# Patient Record
Sex: MALE | Race: WHITE | NOT HISPANIC OR LATINO | Employment: OTHER | URBAN - METROPOLITAN AREA
[De-identification: names, ages, dates, MRNs, and addresses within clinical notes are randomized per-mention and may not be internally consistent; named-entity substitution may affect disease eponyms.]

---

## 2017-01-18 PROCEDURE — 99285 EMERGENCY DEPT VISIT HI MDM: CPT | Mod: 25

## 2017-01-19 ENCOUNTER — HOSPITAL ENCOUNTER (OUTPATIENT)
Facility: HOSPITAL | Age: 82
Discharge: NURSING, REHAB OR LONG-TERM CARE | End: 2017-01-20
Attending: EMERGENCY MEDICINE | Admitting: INTERNAL MEDICINE
Payer: MEDICARE

## 2017-01-19 DIAGNOSIS — E53.8 B12 DEFICIENCY: ICD-10-CM

## 2017-01-19 DIAGNOSIS — R10.9 ABDOMINAL PAIN: ICD-10-CM

## 2017-01-19 DIAGNOSIS — R53.1 GENERALIZED WEAKNESS: ICD-10-CM

## 2017-01-19 DIAGNOSIS — R52 PAIN: ICD-10-CM

## 2017-01-19 DIAGNOSIS — R53.81 PHYSICAL DECONDITIONING: ICD-10-CM
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
## 2017-01-19 DIAGNOSIS — R29.6 MULTIPLE FALLS: Primary | ICD-10-CM

## 2017-01-19 DIAGNOSIS — R05.9 COUGH: ICD-10-CM

## 2017-01-19 PROBLEM — R79.89 ELEVATED SERUM CREATININE: Status: ACTIVE | Noted: 2017-01-19

## 2017-01-19 LAB
ALBUMIN SERPL BCP-MCNC: 3.3 G/DL
ALP SERPL-CCNC: 101 U/L
ALT SERPL W/O P-5'-P-CCNC: 12 U/L
ANION GAP SERPL CALC-SCNC: 9 MMOL/L
AST SERPL-CCNC: 21 U/L
BACTERIA #/AREA URNS HPF: ABNORMAL /HPF
BASOPHILS # BLD AUTO: 0.03 K/UL
BASOPHILS NFR BLD: 0.3 %
BILIRUB SERPL-MCNC: 2 MG/DL
BILIRUB UR QL STRIP: NEGATIVE
BUN SERPL-MCNC: 26 MG/DL
CALCIUM SERPL-MCNC: 8.9 MG/DL
CHLORIDE SERPL-SCNC: 109 MMOL/L
CLARITY UR: CLEAR
CO2 SERPL-SCNC: 19 MMOL/L
COLOR UR: YELLOW
CREAT SERPL-MCNC: 1.5 MG/DL
DIFFERENTIAL METHOD: ABNORMAL
EOSINOPHIL # BLD AUTO: 0.2 K/UL
EOSINOPHIL NFR BLD: 1.6 %
ERYTHROCYTE [DISTWIDTH] IN BLOOD BY AUTOMATED COUNT: 13.6 %
EST. GFR  (AFRICAN AMERICAN): 48 ML/MIN/1.73 M^2
EST. GFR  (NON AFRICAN AMERICAN): 42 ML/MIN/1.73 M^2
GLUCOSE SERPL-MCNC: 142 MG/DL
GLUCOSE UR QL STRIP: NEGATIVE
HCT VFR BLD AUTO: 35.8 %
HGB BLD-MCNC: 12.2 G/DL
HGB UR QL STRIP: ABNORMAL
HYALINE CASTS #/AREA URNS LPF: 0 /LPF
KETONES UR QL STRIP: NEGATIVE
LEUKOCYTE ESTERASE UR QL STRIP: NEGATIVE
LYMPHOCYTES # BLD AUTO: 1.3 K/UL
LYMPHOCYTES NFR BLD: 12.8 %
MCH RBC QN AUTO: 34 PG
MCHC RBC AUTO-ENTMCNC: 34.1 %
MCV RBC AUTO: 100 FL
MICROSCOPIC COMMENT: ABNORMAL
MONOCYTES # BLD AUTO: 1.1 K/UL
MONOCYTES NFR BLD: 10.5 %
NEUTROPHILS # BLD AUTO: 7.6 K/UL
NEUTROPHILS NFR BLD: 74.6 %
NITRITE UR QL STRIP: NEGATIVE
PH UR STRIP: 6 [PH] (ref 5–8)
PLATELET # BLD AUTO: 152 K/UL
PMV BLD AUTO: 9.4 FL
POTASSIUM SERPL-SCNC: 4.6 MMOL/L
PROT SERPL-MCNC: 6.8 G/DL
PROT UR QL STRIP: ABNORMAL
RBC # BLD AUTO: 3.59 M/UL
RBC #/AREA URNS HPF: 12 /HPF (ref 0–4)
SODIUM SERPL-SCNC: 137 MMOL/L
SP GR UR STRIP: 1.02 (ref 1–1.03)
SQUAMOUS #/AREA URNS HPF: 3 /HPF
URN SPEC COLLECT METH UR: ABNORMAL
UROBILINOGEN UR STRIP-ACNC: 1 EU/DL
WBC # BLD AUTO: 10.15 K/UL
WBC #/AREA URNS HPF: 1 /HPF (ref 0–5)

## 2017-01-19 PROCEDURE — 97165 OT EVAL LOW COMPLEX 30 MIN: CPT

## 2017-01-19 PROCEDURE — 82607 VITAMIN B-12: CPT

## 2017-01-19 PROCEDURE — 25000003 PHARM REV CODE 250: Performed by: INTERNAL MEDICINE

## 2017-01-19 PROCEDURE — 25000003 PHARM REV CODE 250: Performed by: STUDENT IN AN ORGANIZED HEALTH CARE EDUCATION/TRAINING PROGRAM

## 2017-01-19 PROCEDURE — 83540 ASSAY OF IRON: CPT

## 2017-01-19 PROCEDURE — G0378 HOSPITAL OBSERVATION PER HR: HCPCS

## 2017-01-19 PROCEDURE — 85025 COMPLETE CBC W/AUTO DIFF WBC: CPT

## 2017-01-19 PROCEDURE — 81000 URINALYSIS NONAUTO W/SCOPE: CPT

## 2017-01-19 PROCEDURE — 36415 COLL VENOUS BLD VENIPUNCTURE: CPT

## 2017-01-19 PROCEDURE — G8979 MOBILITY GOAL STATUS: HCPCS | Mod: CK

## 2017-01-19 PROCEDURE — G8988 SELF CARE GOAL STATUS: HCPCS | Mod: CK

## 2017-01-19 PROCEDURE — 82728 ASSAY OF FERRITIN: CPT

## 2017-01-19 PROCEDURE — 80053 COMPREHEN METABOLIC PANEL: CPT

## 2017-01-19 PROCEDURE — G8987 SELF CARE CURRENT STATUS: HCPCS | Mod: CL

## 2017-01-19 PROCEDURE — G8978 MOBILITY CURRENT STATUS: HCPCS | Mod: CL

## 2017-01-19 PROCEDURE — 97530 THERAPEUTIC ACTIVITIES: CPT

## 2017-01-19 PROCEDURE — 82746 ASSAY OF FOLIC ACID SERUM: CPT

## 2017-01-19 PROCEDURE — 97161 PT EVAL LOW COMPLEX 20 MIN: CPT

## 2017-01-19 RX ORDER — ENOXAPARIN SODIUM 100 MG/ML
40 INJECTION SUBCUTANEOUS EVERY 24 HOURS
Status: DISCONTINUED | OUTPATIENT
Start: 2017-01-20 | End: 2017-01-19

## 2017-01-19 RX ORDER — METOPROLOL TARTRATE 25 MG/1
25 TABLET, FILM COATED ORAL 2 TIMES DAILY
Status: CANCELLED | OUTPATIENT
Start: 2017-01-19

## 2017-01-19 RX ORDER — AMLODIPINE BESYLATE 5 MG/1
5 TABLET ORAL DAILY
Status: DISCONTINUED | OUTPATIENT
Start: 2017-01-19 | End: 2017-01-20 | Stop reason: HOSPADM

## 2017-01-19 RX ORDER — SODIUM CHLORIDE 9 MG/ML
INJECTION, SOLUTION INTRAVENOUS CONTINUOUS
Status: ACTIVE | OUTPATIENT
Start: 2017-01-19 | End: 2017-01-20

## 2017-01-19 RX ORDER — FERROUS SULFATE 325(65) MG
325 TABLET, DELAYED RELEASE (ENTERIC COATED) ORAL DAILY
Status: DISCONTINUED | OUTPATIENT
Start: 2017-01-20 | End: 2017-01-20 | Stop reason: HOSPADM

## 2017-01-19 RX ORDER — LEVETIRACETAM 500 MG/1
500 TABLET ORAL 2 TIMES DAILY
Status: DISCONTINUED | OUTPATIENT
Start: 2017-01-19 | End: 2017-01-20 | Stop reason: HOSPADM

## 2017-01-19 RX ORDER — ENOXAPARIN SODIUM 100 MG/ML
30 INJECTION SUBCUTANEOUS EVERY 24 HOURS
Status: DISCONTINUED | OUTPATIENT
Start: 2017-01-20 | End: 2017-01-20 | Stop reason: HOSPADM

## 2017-01-19 RX ORDER — ACETAMINOPHEN 325 MG/1
325 TABLET ORAL EVERY 6 HOURS PRN
Status: DISCONTINUED | OUTPATIENT
Start: 2017-01-19 | End: 2017-01-20 | Stop reason: HOSPADM

## 2017-01-19 RX ORDER — TAMSULOSIN HYDROCHLORIDE 0.4 MG/1
0.4 CAPSULE ORAL DAILY
Status: DISCONTINUED | OUTPATIENT
Start: 2017-01-20 | End: 2017-01-20 | Stop reason: HOSPADM

## 2017-01-19 RX ADMIN — SODIUM CHLORIDE: 0.9 INJECTION, SOLUTION INTRAVENOUS at 11:01

## 2017-01-19 RX ADMIN — AMLODIPINE BESYLATE 5 MG: 5 TABLET ORAL at 11:01

## 2017-01-19 RX ADMIN — ACETAMINOPHEN 325 MG: 325 TABLET ORAL at 11:01

## 2017-01-19 RX ADMIN — LEVETIRACETAM 500 MG: 500 TABLET ORAL at 10:01

## 2017-01-19 NOTE — ED TRIAGE NOTES
"Pt presents to ED c/o fall that occurred yesterday at home residence. Unwitnesed. Pt c/o right shoulder pain. States it hurts to move his right arm. Pt lives with "lady friend" who is not here and pt is a poor historian. He has bruises to bilateral arms. He is tender and sore to adbomen, right shoulder hips, and knees. Pt appears weak/frail. Uses walker to ambulate but states he has fallen four times recently. Pt denies chest pain, sob. He is hard of hearing. Mouth is dry. Pt has urine on pants- taken off of him at this time. Shirt appears dirty.   "

## 2017-01-19 NOTE — IP AVS SNAPSHOT
\[facility]\""  [address]  [place] [address]  Phone: [phone]           Patient Discharge Instructions     Our goal is to set you up for success. This packet includes information on your condition, medications, and your home care. It will help you to care for yourself so you don't get sicker and need to go back to the hospital.     Please ask your nurse if you have any questions.        There are many details to remember when preparing to leave the hospital. Here is what you will need to do:    1. Take your medicine. If you are prescribed medications, review your Medication List in the following pages. You may have new medications to  at the pharmacy and others that you'll need to stop taking. Review the instructions for how and when to take your medications. Talk with your doctor or nurses if you are unsure of what to do.     2. Go to your follow-up appointments. Specific follow-up information is listed in the following pages. Your may be contacted by a transition nurse or clinical provider about future appointments. Be sure we have all of the phone numbers to reach you, if needed. Please contact your provider's office if you are unable to make an appointment.     3. Watch for warning signs. Your doctor or nurse will give you detailed warning signs to watch for and when to call for assistance. These instructions may also include educational information about your condition. If you experience any of warning signs to your health, call your doctor.               ** Verify the list of medication(s) below is accurate and up to date. Carry this with you in case of emergency. If your medications have changed, please notify your healthcare provider.             Medication List      START taking these medications        Additional Info                      amlodipine 5 MG tablet   Commonly known as:  NORVASC   Quantity:  90 tablet   Refills:  3   Dose:  5 mg    Last time this was given:  5 mg  on 1/20/2017  9:31 AM   Instructions:  Take 1 tablet (5 mg total) by mouth once daily.     Begin Date    AM    Noon    PM    Bedtime       cyanocobalamin 1000 MCG tablet   Commonly known as:  VITAMIN B-12   Refills:  0   Dose:  1000 mcg    Instructions:  Take 1 tablet (1,000 mcg total) by mouth once daily.     Begin Date    AM    Noon    PM    Bedtime       docusate sodium 100 MG capsule   Commonly known as:  COLACE   Refills:  0   Dose:  100 mg    Last time this was given:  100 mg on 1/20/2017  2:09 PM   Instructions:  Take 1 capsule (100 mg total) by mouth 2 (two) times daily.     Begin Date    AM    Noon    PM    Bedtime         CHANGE how you take these medications        Additional Info                      * aspirin 325 MG tablet   Quantity:  30 tablet   Refills:  11   Dose:  325 mg   What changed:  Another medication with the same name was added. Make sure you understand how and when to take each.    Instructions:  Take 1 tablet (325 mg total) by mouth once daily.     Begin Date    AM    Noon    PM    Bedtime       * aspirin 81 MG EC tablet   Commonly known as:  ECOTRIN   Refills:  0   Dose:  81 mg   What changed:  You were already taking a medication with the same name, and this prescription was added. Make sure you understand how and when to take each.    Instructions:  Take 1 tablet (81 mg total) by mouth once daily.     Begin Date    AM    Noon    PM    Bedtime       levetiracetam 500 MG Tab   Commonly known as:  KEPPRA   Quantity:  60 tablet   Refills:  0   What changed:  Another medication with the same name was removed. Continue taking this medication, and follow the directions you see here.    Last time this was given:  500 mg on 1/20/2017  9:31 AM   Instructions:  TAKE 1 TABLET(500 MG) BY MOUTH TWICE DAILY     Begin Date    AM    Noon    PM    Bedtime       * Notice:  This list has 2 medication(s) that are the same as other medications prescribed for you. Read the directions carefully, and ask your  doctor or other care provider to review them with you.      CONTINUE taking these medications        Additional Info                      ferrous sulfate 324 mg (65 mg iron) Tbec   Refills:  0   Dose:  325 mg    Last time this was given:  325 mg on 1/20/2017  9:31 AM   Instructions:  Take 325 mg by mouth once daily.     Begin Date    AM    Noon    PM    Bedtime       hydrOXYzine HCl 25 MG tablet   Commonly known as:  ATARAX   Refills:  3    Instructions:  3 (three) times daily as needed.     Begin Date    AM    Noon    PM    Bedtime       metoprolol tartrate 25 MG tablet   Commonly known as:  LOPRESSOR   Refills:  0   Dose:  25 mg    Instructions:  Take 25 mg by mouth 2 (two) times daily.     Begin Date    AM    Noon    PM    Bedtime       tamsulosin 0.4 mg Cp24   Commonly known as:  FLOMAX   Refills:  0   Dose:  0.4 mg    Last time this was given:  0.4 mg on 1/20/2017  9:31 AM   Instructions:  Take 0.4 mg by mouth once daily.     Begin Date    AM    Noon    PM    Bedtime            Where to Get Your Medications      You can get these medications from any pharmacy     Bring a paper prescription for each of these medications     amlodipine 5 MG tablet       You don't need a prescription for these medications     aspirin 81 MG EC tablet    cyanocobalamin 1000 MCG tablet    docusate sodium 100 MG capsule                  Please bring to all follow up appointments:    1. A copy of your discharge instructions.  2. All medicines you are currently taking in their original bottles.  3. Identification and insurance card.    Please arrive 15 minutes ahead of scheduled appointment time.    Please call 24 hours in advance if you must reschedule your appointment and/or time.        Follow-up Information     Follow up with [facility].    Specialties:  Nursing Home Agency, SNF Agency    Why:  SNF    Contact information:    [address] [place] [address] [phone]          Discharge References/Attachments      "DOCUSATE SODIUM ORAL CAPSULE (ENGLISH)    AMLODIPINE (ENGLISH)        Primary Diagnosis     Your primary diagnosis was:  Vitamin B12 Deficiency      Admission Information     Date & Time Provider Department CSN    1/19/2017  1:10 AM Thomas Ortega MD [facility] [id number]      Care Providers     Provider Role Specialty Primary office phone    Thomas Ortega MD Attending Provider Internal Medicine [phone]      Your Vitals Were     BP Pulse Temp Resp Height Weight    129/78 (BP Location: Right arm, Patient Position: Lying, BP Method: Automatic) 92 98 °F (36.7 °C) (Oral) 18 5' 5" (1.651 m) 53.5 kg (117 lb 14.4 oz)    SpO2 BMI             97% 19.62 kg/m2         Recent Lab Values        5/25/2011 5/26/2011 5/28/2011 5/29/2011 5/30/2011 6/9/2011 7/8/2011 4/17/2014      1:37 AM  2:36 AM  3:11 AM  3:08 AM  3:25 AM  5:51 AM  4:45 AM 10:32 PM    A1C 5.7 5.6 5.8 5.8 6.0 6.4 (H) 7.1 (H) 6.3 (H)      Pending Labs     Order Current Status    Methylmalonic acid, serum In process      Allergies as of 1/20/2017     No Known Allergies      [facility]     [facility] Nurse Care Line - 24/7 Assistance  Unless otherwise directed by your provider, please contact [facility], our nurse care line that is available for 24/7 assistance.     Registered nurses in the [facility] provide clinical advisement, health education, appointment booking, and other advisory services.  Call for this free service at [phone].        Advance Directives     An advance directive is a document which, in the event you are no longer able to make decisions for yourself, tells your healthcare team what kind of treatment you do or do not want to receive, or who you would like to make those decisions for you.  If you do not currently have an advance directive, [facility] encourages you to create one.  For more information call:  [phone] ([phone]), [phone] ([phone]),  or log on to " [email or web address].        Smoking Cessation     If you would like to quit smoking:   You may be eligible for free services if you are a [place] resident and started smoking cigarettes before September 1, 1988.  Call the Smoking Cessation [employer] (SCT) toll free at (716) 446-9679 or (880) 016-2738.   Call 1-355-QUIT-NOW if you do not meet the above criteria.            Language Assistance Services     ATTENTION: Language assistance services are available, free of charge. Please call [phone].      ATENCIÓN: Si habla español, tiene a pina disposición servicios gratuitos de asistencia lingüística. Llame al [phone].     CHÚ Ý: N?u b?n nói Ti?ng Vi?t, có các d?ch v? h? tr? ngôn ng? mi?n phí dành cho b?n. G?i s? [phone].        Stroke Education              [facility] Sign-Up     Activating your [facility] account is as easy as 1-2-3!     1) Visit [email or web address], select Sign Up Now, enter this activation code and your date of birth, then select Next.  [email or web address]  Expires: 3/5/2017  4:17 PM      2) Create a username and password to use when you visit [facility] in the future and select a security question in case you lose your password and select Next.    3) Enter your e-mail address and click Sign Up!    Additional Information  If you have questions, please e-mail [email or web address] or call [phone] to talk to our [facility] staff. Remember, [facility] is NOT to be used for urgent needs. For medical emergencies, dial 911.          [facility] complies with applicable Federal civil rights laws and does not discriminate on the basis of race, color, national origin, age, disability, or sex.

## 2017-01-19 NOTE — PLAN OF CARE
Problem: Physical Therapy Goal  Goal: Physical Therapy Goal  Goals to be met by: 2017     Patient will increase functional independence with mobility by performin. Supine to sit with Contact Guard Assistance  2. Sit to supine with Moderate Assistance  3. Sit to stand transfer with Moderate Assistance  4. Bed to chair transfer with Moderate Assistance using Rolling Walker or hand held assist  5. Gait x 50 feet with Moderate Assistance using Rolling Walker or hand held assist.   6. Ascend/descend 2 stair with Moderate Assistance   7. Lower extremity functional exercise program x 5 to 10 reps with assistance as needed   Outcome: Ongoing (interventions implemented as appropriate)     Pt lives between 2 homes--1 in [place] and 1 in [facility]. Pt has lady friend that usually lives with him, however she has a FT job in [place] and pt is home alone during the day while in [place]. When pt is in [facility], his children/grandchildren assist him with ADLs/ IADLs. Pt reports he gets up and walks with a walker, at night uses urinal at bedside.      Pt required CGA-min to EOB with increased time; Max A for sit <> stand with RW due to heavy posterior lean; mod-max A to maintain standing while counteracting post lean; total assist of 2 to ambulate 4 steps with R with festinating/shuffling gait; pt has rigidity/tone in extremities - pt has Parkinsonlike physical presentation.      Pt is unsafe to be alone - will require 24 hour care and assistance.      Would recommend SNF, caretaker training, gt/txf belt and wc.

## 2017-01-19 NOTE — PROVIDER PROGRESS NOTES - EMERGENCY DEPT.
Encounter Date: 1/18/2017    ED Physician Progress Notes        Physician Note:   The [facility] "[employer]  has been consulted regarding this patient.  She will f/u with the patient in his home tomorrow.  [facility]  has spoken with the family and offered placement.  The pt and daughter refuse nursing home placement.  The daughter states that she will arrange 24 hour care for the patient at home.

## 2017-01-19 NOTE — PT/OT/SLP EVAL
Physical Therapy  Evaluation/Treatment    Darius Paiz Jr.   MRN: [id number]   Admitting Diagnosis: The primary encounter diagnosis was Multiple falls. Diagnoses of Pain, Fall, Abdominal pain, Cough, Physical deconditioning, and Generalized weakness were also pertinent to this visit.    PT Received On: 01/19/17  PT Start Time: 1150     PT Stop Time: 1210 (also 2443-3092)    PT Total Time (min): 20 min +75 min=95 minutes with OT    Billable Minutes:  Evaluation 20 minutes and Therapeutic Activity 25 minutes    Diagnosis: The primary encounter diagnosis was Multiple falls. Diagnoses of Pain, Fall, Abdominal pain, Cough, Physical deconditioning, and Generalized weakness were also pertinent to this visit.      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     Elevated PSA     History of CVA (cerebrovascular accident)     History of subdural hematoma (post traumatic)     Liver disease     Seizures     Stroke     Urinary tract infection       Past Surgical History   Procedure Laterality Date    [place] hole for subdural hematoma      Gastrostomy tube placement      Cataract extraction           General Precautions: Standard, fall, hearing impaired  Orthopedic Precautions: N/A   Braces: N/A       Do you have any cultural, spiritual, Latter day conflicts, given your current situation?: n/a    Patient History:  Living Environment Comment: Pt lives between 2 homes--1 in [place] and 1 in [facility]. Pt has lady friend that usually lives with him, however she has a FT job in [place] and pt is home alone during the day while in [place]; 2 [place] home in [place], no [facility] condo in [place]. When pt is in [facility], his children/grandchildren assist him with ADLs/ IADLs. Pt reports he gets up and walks with a walker, at night uses urinal at bedside.   Equipment  Currently Used at Home: walker, rolling, bedside commode, bath bench    Previous Level of Function:  Ambulation Skills: needs device  Transfer Skills: needs device  ADL Skills: needs assist    Subjective:  Communicated with nurse prior to session.    Chief Complaint: can't walk; pain   Patient goals: return to PLOF    Pain Rating:  (Patient reports that he has pain in his bones, stomach and back, shoulders  but did not rate but hollered with certain movements; increased pain with movement.)               Pain Rating Post-Intervention:  (decreased pain with rest)    Objective:         Cognitive Exam:  Oriented to: Person, Place, and month,day but not year    Follows Commands/attention: Follows one-step commands  Communication: dysarthria  Safety awareness/insight to disability: impaired    Physical Exam:  Postural examination/scapula alignment: Rounded shoulder, Head forward and Posterior pelvic tilt    Skin integrity: Bruising of extremities  Edema: None noted     Sensation:   NT    UE with decreased ROM and rigidity-see OT notes    Lower Extremity Range of Motion: LE increased tone/rigidity  Right Lower Extremity: WFL except decreaesed end range knee ext, hip fl/abd, df  Left Lower Extremity: WFL except decreaesed end range knee ext, hip fl/abd, df    Lower Extremity Strength:  Right Lower Extremity: WFL  Left Lower Extremity: WFL     Gross motor coordination: impaired 2/2 tone/rigidity-movement control deficits    Functional Mobility:  Bed Mobility:  Scooting/Bridging: Contact Guard Assistance, Minimum Assistance (toward EOB with increased time )  Supine to Sit: Contact Guard Assistance, Minimum Assistance (with increased time)  Sit to Supine: Maximum Assistance (for trunk/LEs; pain with pressure on R side)    Transfers:  Sit <> Stand Assistance: Maximum Assistance (with posterior lean)  Sit <> Stand Assistive Device: Rolling Walker, No Assistive Device (3 trials; VCs/MCs for technique/safety)    Gait:   Gait  Distance: 4 steps with RW with pt having posterior lean requiring total A x2 -pt with festinating/shuffling gait  Assistance 1: Total assistance (assist of 2)  Gait Assistive Device: Rolling walker  Gait Pattern: reciprocal  Gait Deviation(s): decreased step length, increased time in double stance, backward lean, decreased toe-to-floor clearance, decreased weight-shifting ability (shuffling)      Balance:   Static Sit: FAIR-: Maintains without assist but inconsistent   Dynamic Sit: POOR: N/A  Static Stand: POOR: Needs MODERATE assist to maintain  Dynamic stand: 0: N/A    Therapeutic Activities and Exercises:  Bed mob and transfers as described above  Instructed pt in sup <>sit technique as described above  Instructed in sit<>stand technique as described above  Educated family on application and use of gt/txf belt and issued  Educated family on sit<>stand technique and stood for attempt at urination (on second half of split visit) and on sit to supine      AM-PAC 6 CLICK MOBILITY  How much help from another person does this patient currently need?   1 = Unable, Total/Dependent Assistance  2 = A lot, Maximum/Moderate Assistance  3 = A little, Minimum/Contact Guard/Supervision  4 = None, Modified [place]/Independent    Turning over in bed (including adjusting bedclothes, sheets and blankets)?: 2  Sitting down on and standing up from a chair with arms (e.g., wheelchair, bedside commode, etc.): 2  Moving from lying on back to sitting on the side of the bed?: 3  Moving to and from a bed to a chair (including a wheelchair)?: 2  Need to walk in hospital room?: 2  Climbing 3-5 steps with a railing?: 1  Total Score: 12     AM-PAC Raw Score CMS G-Code Modifier Level of Impairment Assistance   6 % Total / Unable   7 - 9 CM 80 - 100% Maximal Assist   10 - 14 CL 60 - 80% Moderate Assist   15 - 19 CK 40 - 60% Moderate Assist   20 - 22 CJ 20 - 40% Minimal Assist   23 CI 1-20% SBA / CGA   24 CH 0% Independent/ Mod I      Patient left supine with nurse notified and family present.    Assessment:   Darius Paiz Jr. is a 86 y.o. male with a medical diagnosis of The primary encounter diagnosis was Multiple falls. Diagnoses of Pain, Fall, Abdominal pain, Cough, Physical deconditioning, and Generalized weakness were also pertinent to this visit.  Pt lives between 2 homes--1 in [place] and 1 in [place] beds at the [facility]. Pt has lady friend that usually lives with him, however she has a FT job in [place] and pt is home alone during the day while in [place]. When pt is in [place] beds at the [facility], his children/grandchildren assist him with ADLs/ IADLs. Pt reports he gets up and walks with a walker, at night uses urinal at bedside.   Pt required CGA-min to EOB with increased time; Max A for sit <> stand with RW due to heavy posterior lean; mod-max A to maintain standing while counteracting post lean; total assist of 2 to ambulate 4 steps with R with festinating/shuffling gait; pt has rigidity/tone in extremities - pt has Parkinsonlike physical presentation.  Pt is unsafe to be alone - will require 24 hour care and assistance.  Would recommend SNF, caretaker training, gt/txf belt and wc.    Rehab identified problem list/impairments: Rehab identified problem list/impairments: gait instability, impaired balance, impaired self care skills, impaired endurance, impaired functional mobilty, decreased safety awareness, pain, decreased upper extremity function, decreased lower extremity function, abnormal tone, decreased ROM, impaired coordination, weakness    Rehab potential is good.    Activity tolerance: Fair    Discharge recommendations: Discharge Facility/Level Of Care Needs: nursing facility, skilled     Barriers to discharge: Barriers to Discharge: Decreased caregiver support (increased physical burden on caregiver and decreased safety)    Equipment recommendations: Equipment Needed After Discharge: walker, rolling (gt/txf belt)     GOALS:   Physical Therapy  Goals        Problem: Physical Therapy Goal    Goal Priority Disciplines Outcome Goal Variances Interventions   Physical Therapy Goal     PT/OT, PT Ongoing (interventions implemented as appropriate)     Description:  Goals to be met by: 2017     Patient will increase functional independence with mobility by performin. Supine to sit with Contact Guard Assistance  2. Sit to supine with Moderate Assistance  3. Sit to stand transfer with Moderate Assistance  4. Bed to chair transfer with Moderate Assistance using Rolling Walker or hand held assist  5. Gait  x 50 feet with Moderate Assistance using Rolling Walker or hand held assist.   6. Ascend/descend 2 stair with Moderate Assistance   7. Lower extremity functional exercise program x 5 to 10 reps with assistance as needed                 PLAN:    Patient to be seen 6 x/week to address the above listed problems via gait training, therapeutic activities, therapeutic exercises, neuromuscular re-education  Plan of Care expires: 17  Plan of Care reviewed with: patient, daughter, grandchild(zuleima)    Functional Assessment Tool Used: ampac  Score: 12  Functional Limitation: Mobility: Walking and moving around  Mobility: Walking and Moving Around Current Status (): CL  Mobility: Walking and Moving Around Goal Status (): EMERY Plascencia, PT  2017

## 2017-01-19 NOTE — PLAN OF CARE
Problem: Occupational Therapy Goal  Goal: Occupational Therapy Goal  Goals to be met by: 2/19     Patient will increase functional independence with ADLs by performing:    UE Dressing with Contact Guard Assistance.  LE Dressing with Moderate Assistance.  Grooming while standing with Minimal Assistance.  Toileting from toilet with Moderate Assistance for hygiene and clothing management.   Toilet transfer to toilet with Moderate Assistance.  Outcome: Ongoing (interventions implemented as appropriate)  OT roger performed, report to follow     Pt lives between 2 homes--1 in [place] and 1 in [facility].  Pt has lady friend that usually lives with him, however she has a FT job in [place] and pt is home alone during the day while in [place].  When pt is in [facility], his children/grandchildren assist him with ADLs/ IADLs.  Pt reports he gets up and walks with a walker, at night uses urinal at bedside.      He moved to sit EOB CGA-min and increased time. Dep for dressing, urinal use. Max A for sit to stand w/RW, mod-max A to maintain standing.  Total assist of 2 ambulate 4 steps w/RW.  Pt has Parkinsonlike physical presentation.  Pt has decreased AROM BUE, increased tone, shuffling LE pattern in standing while attempting ambulation.     Pt will require 24 hour care and assistance.     Would rec SNF and

## 2017-01-19 NOTE — PT/OT/SLP EVAL
"Occupational Therapy  Evaluation    Darius Paiz Jr.   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     Elevated PSA     History of CVA (cerebrovascular accident)     History of subdural hematoma (post traumatic)     Liver disease     Seizures     Stroke     Urinary tract infection       Past Surgical History   Procedure Laterality Date    [place] hole for subdural hematoma      Gastrostomy tube placement      Cataract extraction           General Precautions: Standard, fall, hearing impaired  Orthopedic Precautions:    Braces:      Do you have any cultural, spiritual, Orthodox conflicts, given your current situation?: no     Patient History:  Living Environment  Lives With: significant other  Living Arrangements: condomini  Living Environment Comment: Pt lives between 2 homes--1 in [place] and 1 in [facility].  Pt has lady friend that usually lives with him, however she has a FT job in [place] and pt is home alone during the day while in [place].  When pt is in [facility], his children/grandchildren assist him with ADLs/ IADLs.  Pt reports he gets up and walks with a walker, at night uses urinal at bedside.   Equipment Currently Used at Home: bedside commode, walker, rolling, bath bench    Prior level of function:            Dominant hand: ambidextrous    Subjective:  Communicated with nurses prior to session.  "I'm thirsty, but I need that powder"  Chief Complaint: generalized pain  Patient/Family stated goals: return home    Pain Rating: other (see " comments) (reports pain in bones, stomach, sores on his back; did not rate)                   Objective:       Cognitive Exam:  Oriented to: Person, Place, Situation and month, day  Follows Commands/attention: Follows one-step commands  Communication: dysarthria  Memory:  Impaired STM  Safety awareness/insight to disability: impaired  Coping skills/emotional control: Appropriate to situation    Visual/perceptual:  NT    Physical Exam:  Postural examination/scapula alignment: Rounded shoulder, Head forward and Posterior pelvic tilt  Skin integrity: Bruising of hands, arms, back  Edema: None noted     Sensation:   Grossly intact    Upper Extremity Range of Motion:  Right Upper Extremity: limited AROM & PROM shoulder, elbow  Left Upper Extremity: limited AROM & PROM shoulder, elbow    Upper Extremity Strength:  Right Upper Extremity: grossly 2+/5  Left Upper Extremity: grossly 2+/5   Strength: decresed    Fine motor coordination:   impaired    Gross motor coordination: impaired    Functional Mobility:  Bed Mobility:  Rolling/Turning Right: Contact guard assistance  Scooting/Bridging: Minimum Assistance, Stand by Assistance  Supine to Sit: Minimum Assistance, Stand by Assistance    Transfers:  Sit <> Stand Assistance: Total Assistance, Maximum Assistance  Sit <> Stand Assistive Device: Rolling Walker    Functional Ambulation: max A of 2 4 stes w/RW, festering gait    Activities of Daily Living:     Feeding adaptive equipment:      UE adaptive equipment:   LE Dressing Level of Assistance: Total assistance  LE adaptive equipment:            Toileting Level of Assistance: Total assistance        Bathing adaptive equipment:     Balance:   Static Sit: FAIR: Maintains without assist, but unable to take any challenges   Dynamic Sit: FAIR: Cannot move trunk without losing balance  Static Stand: POOR: Needs MODERATE assist to maintain  Dynamic stand: 0: N/A    Therapeutic Activities and Exercises:  Eval completed as above.  "Pt/fam educated on post acute services, need for 24 hour assistance, supervision, DME needs for home.  Pt sat EOB, attempted gait, pt unable to safely ambulate.    AM-PAC 6 CLICK ADL  How much help from another person does this patient currently need?  1 = Unable, Total/Dependent Assistance  2 = A lot, Maximum/Moderate Assistance  3 = A little, Minimum/Contact Guard/Supervision  4 = None, Modified [place]/Independent    Putting on and taking off regular lower body clothing? : 1  Bathing (including washing, rinsing, drying)?: 2  Toileting, which includes using toilet, bedpan, or urinal? : 1  Putting on and taking off regular upper body clothing?: 2  Taking care of personal grooming such as brushing teeth?: 3  Eating meals?: 3  Total Score: 12    AM-PAC Raw Score CMS "G-Code Modifier Level of Impairment Assistance   6 % Total / Unable   7 - 9 CM 80 - 100% Maximal Assist   10 - 14 CL 60 - 80% Moderate Assist   15 - 19 CK 40 - 60% Moderate Assist   20 - 22 CJ 20 - 40% Minimal Assist   23 CI 1-20% SBA / CGA   24 CH 0% Independent/ Mod I       Patient left HOB elevated with all lines intact, call button in reach, nurse notified and family present    Assessment:  Darius Paiz Jr. is a 86 y.o. male with a medical diagnosis of <principal problem not specified> and presents with decreased strength, balance, AROM, safety awareness, indep all needs.  Pt will benefit from skilled OT    Rehab identified problem list/impairments: Rehab identified problem list/impairments: weakness, impaired self care skills, impaired endurance, impaired functional mobilty, gait instability, impaired balance, decreased upper extremity function, decreased lower extremity function, pain, decreased safety awareness, decreased coordination, abnormal tone, impaired fine motor, impaired coordination, impaired muscle length, impaired skin, decreased ROM, impaired joint extensibility    Rehab potential is fair.    Activity tolerance: " Fair    Discharge recommendations: Discharge Facility/Level Of Care Needs: nursing facility, skilled     Barriers to discharge: Barriers to Discharge: Decreased caregiver support    Equipment recommendations: wheelchair     GOALS:   Occupational Therapy Goals        Problem: Occupational Therapy Goal    Goal Priority Disciplines Outcome Interventions   Occupational Therapy Goal     OT, PT/OT Ongoing (interventions implemented as appropriate)    Description:  Goals to be met by: 2/19     Patient will increase functional independence with ADLs by performing:    UE Dressing with Contact Guard Assistance.  LE Dressing with Moderate Assistance.  Grooming while standing with Minimal Assistance.  Toileting from toilet with Moderate Assistance for hygiene and clothing management.   Toilet transfer to toilet with Moderate Assistance.                PLAN:  Patient to be seen 5 x/week to address the above listed problems via therapeutic exercises, self-care/home management, therapeutic activities  Plan of Care expires: 02/19/17  Plan of Care reviewed with: patient, family    OT G-codes  Functional Assessment Tool Used: [facility]  Score: 12  Functional Limitation: Self care  Self Care Current Status (): CL  Self Care Goal Status (): EMERY Desouza OT  01/19/2017

## 2017-01-19 NOTE — PROVIDER PROGRESS NOTES - EMERGENCY DEPT.
Encounter Date: 1/18/2017    ED Physician Progress Notes        Physician Note:   PT FAMILY IS UNABLE TO TAKE THE PATIENT HOME AND ADEQUATELY CARE FOR HIM AS HE IS UNABLE TO STAND AND AMBULATE ON HIS OWN.  THE  HAS WORKED ON HAVING THE PATIENT APPROVED FOR PLACEMENT AT [facility] AND THEY WILL LIKELY BE ABLE TO PLACE HIM THERE TOMORROW.  I HAVE SPOKEN TO DR. CLOUD AND HE AGREES TO ADMIT THE PATIENT AS HE CANNOT WALK AND IS NOT A CANDIDATE TO BE D/C HOME.

## 2017-01-19 NOTE — PROVIDER PROGRESS NOTES - EMERGENCY DEPT.
Encounter Date: 1/18/2017    ED Physician Progress Notes        Physician Note:   PT AND OT HAVE EVALUATED THE PATIENT.  THEY RECOMMEND SKILLED CARE PLACEMENT.  FAMILY AND PT AGREE WITH THIS PLAN.  THE PATIENT NEEDS MORE CARE THAN CAN BE GIVEN TO HIM AT HOME.  I FEEL IT IS IN THE BEST INTEREST OF THE PATIENT TO PLACE HIM IN A SKILLED CARE FACILITY SO I WILL SIGN THE APPROPRIATE PAPERWORK TO ALLOW HIM PLACEMENT.

## 2017-01-19 NOTE — ED NOTES
Pt's daughter and grandson are leaving-contact information obtained. Medicines sent with daughter.

## 2017-01-19 NOTE — ED NOTES
Pt's daughter, Annetta, called to check on pt. Pt gives permission to give his daughter information. Annetta states that she does not want pt going to a nursing home, and will try to figure out a plan for someone to care for him at home. Requests to have  call her at [phone].

## 2017-01-19 NOTE — ED NOTES
Spoke with Peyton, , and states will make phone call to daughter to speak about options for patient.

## 2017-01-19 NOTE — PROGRESS NOTES
consulted to arrange nursing home placement.   made phone contact with daughter, Annetta [phone] to inquire regarding nursing home need.  Daughter reported she does not want nursing home placement.  She request home assistance for patients.   inquired if patient has a [facility] care manager and/or .  Daughter reported she does not know.   said she contact [facility] and inquire about  and .       made phone contact with members services at [facility] and inquired who is patient's assigned .  Hope Mitesh [phone] and [phone] is patient's [facility] .   made phone contact with [facility]  and informed her patient was present in the emergency room being discharged home with daughter after she refused nursing home placement.  Daughter is requesting assistance at home with patient.  Hope took patient's information and said she will follow up with daughter tomorrow.    Physical and occupational therapy evaluated patient and recommended skilled level of care.   consulted to arrange skilled nursing facility placement.   met with patient's daughter, Annetta [phone] at bedside regarding skilled level of care.  Daughter was interested in skilled nursing facility placement.  She reported [facility] as her preference.   told her she would send a referral to [facility] but admission will be pending insurance authorization.   also gave daughter patient's [facility]  name and contact.  Daughter informed if patient does not admit to skilled nursing facility,  will follow up with her tomorrow.     faxed referral to [facility] via right.   made phone contact with long term care access services and completed a  level of care eligibility of tool (LOCET) for nursing facility admission.  Then faxed level one pasrr screen and determination form to the Office of Aging and Adult Services for nursing facility admission.   received notice of medical certification (142 form) from the state giving permission for nursing facility admission effective 01/19/17.     received phone call from Rosalva with [facility] stating patient is accepted for admission pending insurance authorization.  She will request an authorization from [facility] and schedule a 2:30pm appointment for daughter to complete admission paper work.       received phone call from Makenna with [facility] stating request for skilled level of care at [facility] has been sent for medical review.  Makenna reported patient can discharge from the emergency room to home.  If authorization is approved patient can admit from home to [facility] for skilled care.      Per Dr. Weir patient will admit to Dr Ortega service and stay overnight.  informed daughter she will follow up with her in the am regarding status of skilled nursing facility admission,

## 2017-01-19 NOTE — ED PROVIDER NOTES
Encounter Date: 1/18/2017       History     Chief Complaint   Patient presents with    Fall     fall earlier today, denies loc. reports difficulty walking and pain to his right shoulder.      Review of patient's allergies indicates:  No Known Allergies  HPI Comments: Patient is an 86 role male with a past medical history of seizures type 2 diabetes prior strokes, dysphagia, anemia who presents to emergency room for evaluation of right shoulder pain and bilateral hip pain after fall that occurred yesterday.  The patient has had diffuse weakness for the past week and is having more difficulty using his walker.  He states he has pain all over.  He also states he's been having intermittent abdominal pain associated with constipation but denies any fevers vomiting diarrhea dysuria or hematuria.  He has no chest pain or shortness of breath at this time.    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     Elevated PSA     History of CVA (cerebrovascular accident)     History of subdural hematoma (post traumatic)     Liver disease     Seizures     Stroke     Urinary tract infection      Past Medical History Pertinent Negatives   Diagnosis Date Noted    Kidney stone 10/30/2014     Past Surgical History   Procedure Laterality Date    [place] hole for subdural hematoma      Gastrostomy tube placement      Cataract extraction       Family History   Problem Relation Age of Onset    Amblyopia Neg Hx     Blindness Neg Hx     Cancer Neg Hx     Cataracts Neg Hx     Diabetes Neg Hx     Glaucoma Neg Hx     Hypertension Neg Hx     Macular degeneration Neg Hx     Retinal detachment Neg Hx     Strabismus Neg Hx     Stroke Neg Hx     Thyroid disease Neg Hx     Prostate cancer Neg Hx     Kidney disease Neg Hx      Social History   Substance Use Topics     Smoking status: Former Smoker     Packs/day: 0.50     Years: 40.00     Types: Cigarettes    Smokeless tobacco: Former User     Quit date: 8/20/1992    Alcohol use No     Review of Systems   Unable to perform ROS: Age   HENT: Negative for sore throat.    Respiratory: Negative for cough and shortness of breath.    Genitourinary: Negative for dysuria and hematuria.   Musculoskeletal: Positive for arthralgias and myalgias.   Skin: Negative for rash and wound.   Neurological: Negative for dizziness, weakness, numbness and headaches.   Psychiatric/Behavioral: Negative for confusion.       Physical Exam   Initial Vitals   BP Pulse Resp Temp SpO2   01/18/17 2338 01/18/17 2338 01/18/17 2338 01/18/17 2338 01/19/17 0125   156/83 92 18 97.2 °F (36.2 °C) 100 %     Physical Exam    Nursing note and vitals reviewed.  Constitutional: He appears well-developed and well-nourished. No distress.   HENT:   Head: Normocephalic and atraumatic.   Right Ear: External ear normal.   Left Ear: External ear normal.   Mouth/Throat: Oropharynx is clear and moist.   Eyes: Conjunctivae and EOM are normal. Pupils are equal, round, and reactive to light.   Neck: Normal range of motion. Neck supple. No JVD present.   Cardiovascular: Normal rate, regular rhythm and normal heart sounds. Exam reveals no gallop and no friction rub.    No murmur heard.  Pulmonary/Chest: Breath sounds normal. He has no wheezes. He has no rhonchi. He has no rales.   Abdominal: Soft. There is no tenderness. There is no rebound and no guarding.   Musculoskeletal: He exhibits edema (1+ edema b/l lower extremitites) and tenderness (ild tenderness over the right and left lateral hips.  Mild tenderness over the right anterior shoulder).   Neurological: He is alert and oriented to person, place, and time. No sensory deficit.   Patient has 5 out of 5 grasp in the bilateral upper extremities and 5 out of 5 dorsiflexion and plantarflexion the bilateral lower extremities   Skin: Skin  is warm.   Old bruises on bilateral forearms         ED Course   Procedures  Labs Reviewed   URINALYSIS - Abnormal; Notable for the following:        Result Value    Protein, UA 3+ (*)     Occult Blood UA 2+ (*)     All other components within normal limits   CBC W/ AUTO DIFFERENTIAL - Abnormal; Notable for the following:     RBC 3.59 (*)     Hemoglobin 12.2 (*)     Hematocrit 35.8 (*)      (*)     MCH 34.0 (*)     Mono # 1.1 (*)     Gran% 74.6 (*)     Lymph% 12.8 (*)     All other components within normal limits   COMPREHENSIVE METABOLIC PANEL - Abnormal; Notable for the following:     CO2 19 (*)     Glucose 142 (*)     BUN, Bld 26 (*)     Creatinine 1.5 (*)     Albumin 3.3 (*)     Total Bilirubin 2.0 (*)     eGFR if  48 (*)     eGFR if non  42 (*)     All other components within normal limits   URINALYSIS MICROSCOPIC - Abnormal; Notable for the following:     RBC, UA 12 (*)     All other components within normal limits             Medical Decision Making:   Patient had a mechanical fall yesterday.  His family states he is feeling diffusely weak.  Vital signs are stable here.  I ordered a right shoulder x-ray which did not show anything acute.  Patient's CBC appears to be stable.  Chemistry shows slight elevation in his creatinine and this could be associated with dehydration.  Chest x-ray shows no acute cardiopulmonary process.  Abdominal x-ray shows no signs of obstruction.  X-rays of bilateral hips show diffuse osteopenia but no signs of acute fracture or malalignment.  Shoulder x-ray shows no acute fracture.    5:51 AM CT head shows nothing acute.  The patient appears to have deconditioning that's rapidly worsened recently to the point where he no longer can use his walker.  This is lead to multiple falls.  His at home with his girlfriend who is not able to care for him.  I do not feel that he is safe for discharge given that he can't even really stand up on his own here  in emergency room.  I don't think he has a hip fracture given that is not complaining of severe pain in his pelvis or hips when he stands.    6:25 AM spoke with the attending, Dr. Osorio and she would like  to evaluate the patient.  I have also ordered a physical therapy consult.  [facility] would like their opinion prior to deciding upon admission.                    ED Course     Clinical Impression:   The primary encounter diagnosis was Multiple falls. Diagnoses of Pain, Fall, Abdominal pain, Cough, Physical deconditioning, and Generalized weakness were also pertinent to this visit.          Isaías Baptiste MD  01/19/17 0552       Isaías Baptiste MD  01/19/17 0626

## 2017-01-19 NOTE — ED NOTES
Dr. Baptiste at bedside for reassessment. Attempted to stand and walk pt. Pt unable to stand without pain. Pt very weak and needed maximum assistance to stand out of the bed. Pt will be admitted due to frequent falls. Pt aware of plan.

## 2017-01-20 VITALS
BODY MASS INDEX: 19.64 KG/M2 | DIASTOLIC BLOOD PRESSURE: 78 MMHG | OXYGEN SATURATION: 97 % | HEIGHT: 65 IN | RESPIRATION RATE: 18 BRPM | TEMPERATURE: 98 F | WEIGHT: 117.88 LBS | HEART RATE: 92 BPM | SYSTOLIC BLOOD PRESSURE: 129 MMHG

## 2017-01-20 PROBLEM — E53.8 B12 DEFICIENCY: Status: ACTIVE | Noted: 2017-01-20

## 2017-01-20 LAB
ANION GAP SERPL CALC-SCNC: 7 MMOL/L
BUN SERPL-MCNC: 23 MG/DL
CALCIUM SERPL-MCNC: 8.5 MG/DL
CHLORIDE SERPL-SCNC: 111 MMOL/L
CO2 SERPL-SCNC: 22 MMOL/L
CREAT SERPL-MCNC: 1.3 MG/DL
EST. GFR  (AFRICAN AMERICAN): 57 ML/MIN/1.73 M^2
EST. GFR  (NON AFRICAN AMERICAN): 49 ML/MIN/1.73 M^2
FERRITIN SERPL-MCNC: 311 NG/ML
FOLATE SERPL-MCNC: 7.7 NG/ML
GLUCOSE SERPL-MCNC: 116 MG/DL
IRON SERPL-MCNC: 36 UG/DL
POTASSIUM SERPL-SCNC: 4 MMOL/L
SATURATED IRON: 18 %
SODIUM SERPL-SCNC: 140 MMOL/L
TOTAL IRON BINDING CAPACITY: 204 UG/DL
TRANSFERRIN SERPL-MCNC: 138 MG/DL
VIT B12 SERPL-MCNC: <146 PG/ML

## 2017-01-20 PROCEDURE — 83921 ORGANIC ACID SINGLE QUANT: CPT

## 2017-01-20 PROCEDURE — 97802 MEDICAL NUTRITION INDIV IN: CPT

## 2017-01-20 PROCEDURE — G0378 HOSPITAL OBSERVATION PER HR: HCPCS

## 2017-01-20 PROCEDURE — 97530 THERAPEUTIC ACTIVITIES: CPT

## 2017-01-20 PROCEDURE — 63600175 PHARM REV CODE 636 W HCPCS: Performed by: INTERNAL MEDICINE

## 2017-01-20 PROCEDURE — 25000003 PHARM REV CODE 250: Performed by: INTERNAL MEDICINE

## 2017-01-20 PROCEDURE — 80048 BASIC METABOLIC PNL TOTAL CA: CPT

## 2017-01-20 PROCEDURE — 25000003 PHARM REV CODE 250: Performed by: STUDENT IN AN ORGANIZED HEALTH CARE EDUCATION/TRAINING PROGRAM

## 2017-01-20 PROCEDURE — 97110 THERAPEUTIC EXERCISES: CPT

## 2017-01-20 PROCEDURE — 36415 COLL VENOUS BLD VENIPUNCTURE: CPT

## 2017-01-20 PROCEDURE — 97116 GAIT TRAINING THERAPY: CPT

## 2017-01-20 PROCEDURE — 94761 N-INVAS EAR/PLS OXIMETRY MLT: CPT

## 2017-01-20 RX ORDER — CYANOCOBALAMIN 1000 UG/ML
1000 INJECTION, SOLUTION INTRAMUSCULAR; SUBCUTANEOUS ONCE
Status: COMPLETED | OUTPATIENT
Start: 2017-01-20 | End: 2017-01-20

## 2017-01-20 RX ORDER — ASPIRIN 81 MG/1
81 TABLET ORAL DAILY
Refills: 0 | Status: ON HOLD | COMMUNITY
Start: 2017-01-20 | End: 2017-03-14

## 2017-01-20 RX ORDER — DOCUSATE SODIUM 100 MG/1
100 CAPSULE, LIQUID FILLED ORAL 2 TIMES DAILY
Refills: 0 | Status: ON HOLD | COMMUNITY
Start: 2017-01-20 | End: 2017-03-14

## 2017-01-20 RX ORDER — PNV NO.95/FERROUS FUM/FOLIC AC 28MG-0.8MG
100 TABLET ORAL DAILY
COMMUNITY
Start: 2017-01-20 | End: 2017-01-20

## 2017-01-20 RX ORDER — DOCUSATE SODIUM 100 MG/1
100 CAPSULE, LIQUID FILLED ORAL DAILY
Status: DISCONTINUED | OUTPATIENT
Start: 2017-01-20 | End: 2017-01-20 | Stop reason: HOSPADM

## 2017-01-20 RX ORDER — LANOLIN ALCOHOL/MO/W.PET/CERES
1000 CREAM (GRAM) TOPICAL DAILY
Status: ON HOLD | COMMUNITY
Start: 2017-01-20 | End: 2017-03-14

## 2017-01-20 RX ORDER — DOCUSATE SODIUM 100 MG/1
100 CAPSULE, LIQUID FILLED ORAL DAILY
Refills: 0 | COMMUNITY
Start: 2017-01-20 | End: 2017-01-20

## 2017-01-20 RX ORDER — AMLODIPINE BESYLATE 5 MG/1
5 TABLET ORAL DAILY
Qty: 90 TABLET | Refills: 3 | Status: ON HOLD | OUTPATIENT
Start: 2017-01-20 | End: 2017-03-14

## 2017-01-20 RX ADMIN — LEVETIRACETAM 500 MG: 500 TABLET ORAL at 09:01

## 2017-01-20 RX ADMIN — ENOXAPARIN SODIUM 30 MG: 100 INJECTION SUBCUTANEOUS at 11:01

## 2017-01-20 RX ADMIN — AMLODIPINE BESYLATE 5 MG: 5 TABLET ORAL at 09:01

## 2017-01-20 RX ADMIN — ACETAMINOPHEN 325 MG: 325 TABLET ORAL at 10:01

## 2017-01-20 RX ADMIN — CYANOCOBALAMIN 1000 MCG: 1000 INJECTION, SOLUTION INTRAMUSCULAR; SUBCUTANEOUS at 09:01

## 2017-01-20 RX ADMIN — FERROUS SULFATE TAB EC 325 MG (65 MG FE EQUIVALENT) 325 MG: 325 (65 FE) TABLET DELAYED RESPONSE at 09:01

## 2017-01-20 RX ADMIN — TAMSULOSIN HYDROCHLORIDE 0.4 MG: 0.4 CAPSULE ORAL at 09:01

## 2017-01-20 RX ADMIN — DOCUSATE SODIUM 100 MG: 100 CAPSULE, LIQUID FILLED ORAL at 02:01

## 2017-01-20 NOTE — PROGRESS NOTES
[facility] Medicine Resident HO-III Progress Note    Subjective:      Darius Paiz Jr. is a 86 y.o. male who is being followed by the U Medicine service at [facility] for frequent falls.     Patient did well overnight, only with minimal complaints of pain. Denies other complaints.     Objective:   Last 24 Hour Vital Signs:  BP  Min: 147/73  Max: 162/78  Temp  Av.3 °F (36.8 °C)  Min: 98.3 °F (36.8 °C)  Max: 98.3 °F (36.8 °C)  Pulse  Av.8  Min: 79  Max: 97  Resp  Av  Min: 16  Max: 16  SpO2  Av.6 %  Min: 95 %  Max: 100 %  I/O last 3 completed shifts:  In: -   Out: 300 [Urine:300]    Physical Examination:  General appearance: alert, appears stated age, cachectic, cooperative and no distress  Head: Normocephalic, without obvious abnormality, atraumatic  Eyes: conjunctivae/corneas clear. PERRL, EOM's intact. Fundi benign.  Neck: supple, symmetrical, trachea midline, thyroid not enlarged, symmetric, no tenderness/mass/nodules and JVP approx 5cm  Lungs: clear to auscultation bilaterally  Heart: regular rate and rhythm, S1, S2 normal, no murmur, click, rub or gallop  Abdomen: soft, non-tender; bowel sounds normal; no masses, no organomegaly  Extremities: extremities normal, atraumatic, no cyanosis or edema  Pulses: 2+ and symmetric  Skin: Skin color, texture, turgor normal. No rashes or lesions  Neurologic: Grossly normal     Laboratory:  Laboratory Data Reviewed: yes  Pertinent Findings:  B12 < 146    Microbiology Data Reviewed: yes  Pertinent Findings:  None    Other Results:  Radiology Data Reviewed: yes  Pertinent Findings:  X-ray hip:   Diffuse osteopenia makes evaluation difficult.    No evidence of fracture or malalignment involving either hip.    Bilateral advanced osteoarthrosis.    KUB: Nonspecific bowel gas pattern without evidence of obstruction.    CXR: No acute process.    Current Medications:     Infusions:   sodium chloride 0.9% 50 mL/hr at 17 6216         Scheduled:   amlodipine  5 mg Oral Daily    enoxaparin  30 mg Subcutaneous Daily    ferrous sulfate  325 mg Oral Daily    levetiracetam  500 mg Oral BID    tamsulosin  0.4 mg Oral Daily        PRN:  acetaminophen, flu vacc [id number] 65yr up(PF), pneumoc 13-brice conj-dip cr(PF)    Antibiotics and Day Number of Therapy:  None    Lines and Day Number of Therapy:  PIV    Assessment:     Darius Paiz Jr. is a 86 y.o.male with  Patient Active Problem List    Diagnosis Date Noted    Fall 01/19/2017    Elevated serum creatinine 01/19/2017    Multiple falls 01/19/2017    CVA (cerebral infarction) 04/17/2014    Gait disorder 10/07/2013    BPH (benign prostatic hyperplasia) 06/17/2013    Seizure 08/20/2012        Plan:     Falls/Deconditioning  - patient with recent increase in falls, family unable to care for at home  - SNF placement being arranged currently, unable to arrange transport and authorization tonight  - will place in observation overnight for patient safety  - PT/OT eval and treat - likely discharge to SNF later today  - B12 level severely low, will replete - may be contributing to falls     History of CVA   - on ASA, statin  - will continue  - no residual deficits     Macrocytic Anemia 2/2 B12 Deficiency  - , H/H very slightly low  - B12 severely depleted, will give IM injection x1, start PO repletion on discharge  - may be significant contributor to falls     Seizure Disorder  - no active seizures  - continue home keppra     BPH  - chronic issue  - continue home flomax     HTN  - on lopressor at home, may not be best choice in patient with falls  - will start norvasc for BP control, slightly elevated in ED     HCM  - needs influenza, pneumovax  - PCP Dr. Duncan     PPx  - lovenox     Dispo  - pending SNF placement    Mookie Whitman  hospitals Internal Medicine HO-III  hospitals Hospital Medicine Service Team B    hospitals Medicine Hospitalist Pager numbers:   hospitals Hospitalist Medicine Team A  (Joey/Jo): [phone]  [facility] Hospitalist Medicine Team B (Serenity/Jordan):  [phone]

## 2017-01-20 NOTE — PROGRESS NOTES
[facility] cannot admit pt today, pt's daughter agreeable to admit to [facility]. Makenna with PHN updated of above a d authorization being built and will be faxed to [facility].  SNF Auth  Per makenna with PHN [id number]. Team updated and facility transfer orders updated of above.

## 2017-01-20 NOTE — PLAN OF CARE
Problem: Physical Therapy Goal  Goal: Physical Therapy Goal  Goals to be met by: 2017     Patient will increase functional independence with mobility by performin. Supine to sit with Contact Guard Assistance  2. Sit to supine with Moderate Assistance  3. Sit to stand transfer with Moderate Assistance  4. Bed to chair transfer with Moderate Assistance using Rolling Walker or hand held assist  5. Gait x 50 feet with Moderate Assistance using Rolling Walker or hand held assist.   6. Ascend/descend 2 stair with Moderate Assistance   7. Lower extremity functional exercise program x 5 to 10 reps with assistance as needed   Outcome: Ongoing (interventions implemented as appropriate)  Patient with complaints of not being able to urinate or defecate; nurse and MD aware; rolling L/R with Onofre, sup to sit with Onofre, maxA sit to supine, sit to stand with maxA and total A x2 to amb 10ft with post lean, shuffling steps; tolerated LE/trunk mobility ex; fearful of falling; will cont with POC.

## 2017-01-20 NOTE — PLAN OF CARE
[facility]    FACILITY TRANSFER ORDERS      Patient Name: Darius Paiz Jr.  YOB: 1930    PCP: Kaleigh Duncan MD   PCP Address: [address] [place] [address] [address] / [place]  PCP Phone Number: [phone]  PCP Fax: [phone]    Encounter Date: 01/20/2017    Admit to: SNF; [facility]    Vital Signs:  Routine    Diagnoses:   Active Hospital Problems    Diagnosis  POA    *B12 deficiency [E53.8]  Yes    Physical deconditioning [R53.81]  Yes    Fall [W19.XXXA]  Yes    Elevated serum creatinine [R79.89]  Yes    Multiple falls [R29.6]  Not Applicable    Gait disorder [R26.9]  Yes    BPH (benign prostatic hyperplasia) [N40.0]  Yes      Resolved Hospital Problems    Diagnosis Date Resolved POA   No resolved problems to display.       Allergies:Review of patient's allergies indicates:  No Known Allergies    Diet: regular diet, Add Boost Glucose 1x/day    Activities: Activity as tolerated    Nursing: Fall Risk    Labs: None Once      CONSULTS:    Physical Therapy to evaluate and treat. , Occupational Therapy to evaluate and treat. and Speech Therapy to evaluate and treat for Swallowing.    MISCELLANEOUS CARE:  None    WOUND CARE ORDERS  None    Medications: Review discharge medications with patient and family and provide education.      Current Discharge Medication List      START taking these medications    Details   amlodipine (NORVASC) 5 MG tablet Take 1 tablet (5 mg total) by mouth once daily.  Qty: 90 tablet, Refills: 3      aspirin (ECOTRIN) 81 MG EC tablet Take 1 tablet (81 mg total) by mouth once daily.  Refills: 0      cyanocobalamin (VITAMIN B-12) 1000 MCG tablet Take 1 tablet (1,000 mcg total) by mouth once daily.      docusate sodium (COLACE) 100 MG capsule Take 1 capsule (100 mg total) by mouth 2 (two) times daily.  Refills: 0         CONTINUE these medications which have NOT CHANGED    Details   aspirin 325 MG tablet Take 1 tablet (325 mg total) by mouth once daily.  Qty:  30 tablet, Refills: 11      ferrous sulfate 324 mg (65 mg iron) TbEC Take 325 mg by mouth once daily.      hydrOXYzine (ATARAX) 25 MG tablet 3 (three) times daily as needed.   Refills: 3      levetiracetam (KEPPRA) 500 MG Tab TAKE 1 TABLET(500 MG) BY MOUTH TWICE DAILY  Qty: 60 tablet, Refills: 0      metoprolol tartrate (LOPRESSOR) 25 MG tablet Take 25 mg by mouth 2 (two) times daily.       tamsulosin (FLOMAX) 0.4 mg Cp24 Take 0.4 mg by mouth once daily.                  _________________________________  Freda Redding MD  01/20/2017

## 2017-01-20 NOTE — PT/OT/SLP PROGRESS
"Physical Therapy  Treatment    Darius Paiz Jr.   MRN: [id number]   Admitting Diagnosis: B12 deficiency    PT Received On: 01/20/17  PT Start Time: 1138     PT Stop Time: 1224    PT Total Time (min): 46 min       Billable Minutes:  Gait [id number] minutes, Therapeutic Activity 15 minutes and Therapeutic Exercise 19 minutes    Treatment Type: Treatment        PTA Visit Number: 0       General Precautions: Standard, fall, hearing impaired  Orthopedic Precautions: N/A   Braces: N/A    Do you have any cultural, spiritual, Mormonism conflicts, given your current situation?: n/a    Subjective:  Communicated with nurse prior to session.  Pt c/o not being able to "pee" or "poo"    Pain Rating:  (reported pain in R shld and R side with pressure, but did not rate; no pain at rest)           Pain Addressed: Reposition, Distraction, Cessation of Activity       Objective:   Patient found with: peripheral IV    Functional Mobility:  Bed Mobility:   Rolling/Turning to Left: Minimum assistance (x4 trials with VCs and increased time)  Rolling/Turning Right: Minimum assistance (x 4 trials wi th VCs and increased time)  Scooting/Bridging: Minimum Assistance (toward EOB; shifting in bed with Onofre and pt bridging)  Supine to Sit: Minimum Assistance (increased time with VCs)  Sit to Supine: Maximum Assistance (trunk and LEs)    Transfers:  Sit <> Stand Assistance: Maximum Assistance (increased time, rocking forward and standing on 3rd count; counteracting of post lean; min/modA to maintain standing)  Sit <> Stand Assistive Device: Rolling Walker    Gait:   Gait Distance: 10ft with RW and total A x2 with posterior lean -festinating/shuffling gait  Assistance 1: Total assistance (x2)  Gait Assistive Device: Rolling walker  Gait Pattern: reciprocal  Gait Deviation(s): increased time in double stance, backward lean, decreased weight-shifting ability, decreased toe-to-floor clearance (shuffling)      Balance:   Static Sit: FAIR-: Maintains " without assist but inconsistent   Dynamic Sit: POOR: N/A  Static Stand: POOR: Needs MODERATE assist to maintain  Dynamic stand: 0: N/A     Therapeutic Activities and Exercises:  Rolling as described above   Sup to sit with pt assisting using UEs, assistance with LEs and trunk  AA ex- trunk forward fl, rotation and laterals x 10 each; LE APs, FAQ, marches, hip abd/add with assist and LE gastroc/ham stretches 3 x 30 sec; standing scapular retraction x 10, hip fl stretch with lumbar ext x 5 reps x 5-10 seconds  Sit to stand with maxA as described above x 3 trials  Standing lateral wt shifts, marches in place  GT as described above    AM-PAC 6 CLICK MOBILITY  How much help from another person does this patient currently need?   1 = Unable, Total/Dependent Assistance  2 = A lot, Maximum/Moderate Assistance  3 = A little, Minimum/Contact Guard/Supervision  4 = None, Modified [place]/Independent    Turning over in bed (including adjusting bedclothes, sheets and blankets)?: 3  Sitting down on and standing up from a chair with arms (e.g., wheelchair, bedside commode, etc.): 2  Moving from lying on back to sitting on the side of the bed?: 3  Moving to and from a bed to a chair (including a wheelchair)?: 2  Need to walk in hospital room?: 2  Climbing 3-5 steps with a railing?: 1  Total Score: 13    AM-PAC Raw Score CMS G-Code Modifier Level of Impairment Assistance   6 % Total / Unable   7 - 9 CM 80 - 100% Maximal Assist   10 - 14 CL 60 - 80% Moderate Assist   15 - 19 CK 40 - 60% Moderate Assist   20 - 22 CJ 20 - 40% Minimal Assist   23 CI 1-20% SBA / CGA   24 CH 0% Independent/ Mod I     Patient left supine with all lines intact, call button in reach, bed alarm on and nurse notified.    Assessment:  Darius Paiz Jr. is a 86 y.o. male with a medical diagnosis of B12 deficiency   Patient with complaints of not being able to urinate or defecate; nurse and MD aware; rolling L/R with Onofre, sup to sit with Onofre,  maxA sit to supine, sit to stand with maxA and total A x2 to amb 10ft with post lean, shuffling steps; tolerated LE/trunk mobility ex; fearful of falling; will cont with POC.  Rehab identified problem list/impairments: Rehab identified problem list/impairments: weakness, impaired self care skills, impaired endurance, impaired balance, gait instability, impaired functional mobilty, abnormal tone, impaired joint extensibility, decreased lower extremity function, decreased upper extremity function, impaired coordination, impaired fine motor, decreased safety awareness, decreased ROM, pain    Rehab potential is good.    Activity tolerance: Fair -fear of falling    Discharge recommendations: Discharge Facility/Level Of Care Needs: nursing facility, skilled     Barriers to discharge: Barriers to Discharge: Decreased caregiver support    Equipment recommendations: Equipment Needed After Discharge: wheelchair     GOALS:   Physical Therapy Goals        Problem: Physical Therapy Goal    Goal Priority Disciplines Outcome Goal Variances Interventions   Physical Therapy Goal     PT/OT, PT Ongoing (interventions implemented as appropriate)     Description:  Goals to be met by: 2017     Patient will increase functional independence with mobility by performin. Supine to sit with Contact Guard Assistance  2. Sit to supine with Moderate Assistance  3. Sit to stand transfer with Moderate Assistance  4. Bed to chair transfer with Moderate Assistance using Rolling Walker or hand held assist  5. Gait  x 50 feet with Moderate Assistance using Rolling Walker or hand held assist.   6. Ascend/descend 2 stair with Moderate Assistance   7. Lower extremity functional exercise program x 5 to 10 reps with assistance as needed                 PLAN:    Patient to be seen 6 x/week  to address the above listed problems via gait training, therapeutic activities, therapeutic exercises  Plan of Care expires: 17  Plan of Care reviewed  with: patient, daughter, grandchild(zuleima)         Ebony Plascencia, PT  01/20/2017

## 2017-01-20 NOTE — PROGRESS NOTES
Patient is complaining that his bladder is full but is not able to urinate.  Dr. Hernandes notified.  Order to straight in and out cath.  Will perform.  Will continue to monitor.

## 2017-01-20 NOTE — H&P
[facility] History and Physical - Resident Note    Admitting Team: [facility] Hospital Medicine Team B  Attending Physician: Dr. Thomas Ortega  Resident: Dr. Mookie Whitman  Interns: Dr. Flavio Rosales    Date of Admit: 1/19/2017    Chief Complaint     Fall (fall earlier today, denies loc. reports difficulty walking and pain to his right shoulder. )   for weeks    Subjective:      History of Present Illness:  Darius Paiz Jr. is a 86 y.o. 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 The patient presented to [facility] on 1/19/2017 with a primary complaint of Fall (fall earlier today, denies loc. reports difficulty walking and pain to his right shoulder. )      The patient was in their usual state of health until the last few weeks when his family noted that he has been becoming weaker, more fatigued, and falling more frequently. States that he has fallen 3-4 times in the past two weeks, most recently yesterday when he could not  his walker anymore and fell to the floor, injuring his shoulder. Patient was brought to the ED and arrangements are being made for skilled nursing facility but patient was unable to return home out of fear for his own safety. Patient denies any fevers, chills, nausea/vomiting.    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 Authorizing Provider   aspirin 325 MG tablet Take 1 tablet (325 mg total) by mouth once daily. 4/21/14 4/21/15  Seun Goins MD   ferrous sulfate 324 mg (65 mg iron) TbEC Take 325 mg by mouth once daily.    Historical Provider, MD   hydrOXYzine (ATARAX) 25 MG tablet 3 (three) times daily as needed.  9/8/14   Historical Provider, MD   levetiracetam (KEPPRA) 500 MG Tab TAKE 1 TABLET(500 MG) BY MOUTH TWICE DAILY 12/2/16   Mookie Hollis MD   metoprolol tartrate (LOPRESSOR) 25 MG tablet Take 25 mg by mouth 2 (two) times daily.  4/17/13   Historical Provider, MD   tamsulosin (FLOMAX) 0.4 mg Cp24 Take 0.4 mg by mouth once daily.    Historical Provider, MD   levetiracetam (KEPPRA) 500 MG Tab TAKE 1 TABLET BY MOUTH TWICE DAILY 10/15/15 1/19/17  Mookie Hollis MD       Family History:  Family History   Problem Relation Age of Onset    Amblyopia Neg Hx     Blindness Neg Hx     Cancer Neg Hx     Cataracts Neg Hx     Diabetes Neg Hx     Glaucoma Neg Hx     Hypertension Neg Hx     Macular degeneration Neg Hx     Retinal detachment Neg Hx     Strabismus Neg Hx     Stroke Neg Hx     Thyroid disease Neg Hx     Prostate cancer Neg Hx     Kidney disease Neg Hx        Social History:  Social History   Substance Use Topics    Smoking status: Former Smoker     Packs/day: 0.50     Years: 40.00     Types: Cigarettes    Smokeless tobacco: Former User     Quit date: 8/20/1992    Alcohol use No       Review of Systems:  Pertinent items are noted in HPI. All other systems are reviewed and are negative.    Health  "Maintaince :   Primary Care Physician: Dr. Duncan  Immunizations:   TDap is up to date.  Influenza is not up to date.  Pneumovax is not up to date.  Cancer Screening:  Colonoscopy: is not up to date.     Objective:   Last 24 Hour Vital Signs:  BP  Min: 151/70  Max: 176/52  Temp  Av.2 °F (36.2 °C)  Min: 97.2 °F (36.2 °C)  Max: 97.2 °F (36.2 °C)  Pulse  Av.8  Min: 79  Max: 97  Resp  Av.7  Min: 16  Max: 18  SpO2  Av.6 %  Min: 95 %  Max: 100 %  Height  Av' 5" (165.1 cm)  Min: 5' 5" (165.1 cm)  Max: 5' 5" (165.1 cm)  Weight  Av.6 kg (127 lb)  Min: 57.6 kg (127 lb)  Max: 57.6 kg (127 lb)  Body mass index is 21.13 kg/(m^2).  I/O last 3 completed shifts:  In: -   Out: 300 [Urine:300]    Physical Examination:  General appearance: alert, appears stated age, cachectic, cooperative and no distress  Head: Normocephalic, without obvious abnormality, atraumatic  Eyes: conjunctivae/corneas clear. PERRL, EOM's intact. Fundi benign.  Neck: supple, symmetrical, trachea midline, thyroid not enlarged, symmetric, no tenderness/mass/nodules and JVP approx 5cm  Lungs: clear to auscultation bilaterally  Heart: regular rate and rhythm, S1, S2 normal, no murmur, click, rub or gallop  Abdomen: soft, non-tender; bowel sounds normal; no masses,  no organomegaly  Extremities: extremities normal, atraumatic, no cyanosis or edema  Pulses: 2+ and symmetric  Skin: Skin color, texture, turgor normal. No rashes or lesions  Neurologic: Grossly normal      Laboratory:  Most Recent Data:  CBC: Lab Results   Component Value Date    WBC 10.15 2017    HGB 12.2 (L) 2017    HCT 35.8 (L) 2017     2017     (H) 2017    RDW 13.6 2017     WBC Differential: 75 % N, 0 % Bands, 13 % L, 11 % M, 2 % Eo, 0 % Baso, 0 additional cells seen  BMP: Lab Results   Component Value Date     2017    K 4.6 2017     2017    CO2 19 (L) 2017    BUN 26 (H) 2017    " CREATININE 1.5 (H) 01/19/2017     (H) 01/19/2017    CALCIUM 8.9 01/19/2017    MG 1.8 04/21/2014    PHOS 2.2 (L) 04/21/2014     LFTs: Lab Results   Component Value Date    PROT 6.8 01/19/2017    ALBUMIN 3.3 (L) 01/19/2017    BILITOT 2.0 (H) 01/19/2017    AST 21 01/19/2017    ALKPHOS 101 01/19/2017    ALT 12 01/19/2017     Coags:   Lab Results   Component Value Date    INR 1.0 04/17/2014     FLP: Lab Results   Component Value Date    CHOL 155 04/17/2014    HDL 51 04/17/2014    LDLCALC 93.2 04/17/2014    TRIG 54 04/17/2014    CHOLHDL 32.9 04/17/2014     DM: Lab Results   Component Value Date    HGBA1C 6.3 (H) 04/17/2014    HGBA1C 7.1 (H) 07/08/2011    HGBA1C 6.4 (H) 06/09/2011    LDLCALC 93.2 04/17/2014    CREATININE 1.5 (H) 01/19/2017     Thyroid: Lab Results   Component Value Date    TSH 2.136 04/17/2014    FREET4 1.06 06/27/2011    P4TTBTI 4.9 06/27/2011     Anemia: Lab Results   Component Value Date    IRON 63 04/17/2014    TIBC 226 (L) 04/17/2014    FERRITIN 171 04/17/2014    PNNUGMTT48 277 04/18/2014    FOLATE 18.2 04/18/2014     Cardiac: Lab Results   Component Value Date    TROPONINI <0.006 04/17/2014    BNP 39 04/17/2014     Urinalysis: Lab Results   Component Value Date    LABURIN  01/20/2014     Multiple organisms isolated. None in predominance.  Repeat if    LABURIN clinically necessary. 01/20/2014    COLORU Yellow 01/19/2017    SPECGRAV 1.025 01/19/2017    NITRITE Negative 01/19/2017    KETONESU Negative 01/19/2017    UROBILINOGEN 1.0 01/19/2017    WBCUA 1 01/19/2017       Trended Lab Data:    Recent Labs  Lab 01/19/17  0209   WBC 10.15   HGB 12.2*   HCT 35.8*      *   RDW 13.6      K 4.6      CO2 19*   BUN 26*   CREATININE 1.5*   *   PROT 6.8   ALBUMIN 3.3*   BILITOT 2.0*   AST 21   ALKPHOS 101   ALT 12       Other Results:  Radiology:  Imaging Results         CT Head Without Contrast (Final result) Result time:  01/19/17 05:31:06    Final result by Diandra Iniguez,  MD (01/19/17 05:31:06)    Impression:      1.  No CT evidence of acute intracranial abnormality.  There is clinical concern for acute ischemia, further evaluation with MRI is recommended if there are no contraindications.    2.  Generalized cerebral volume loss, chronic microvascular ischemic change, and remote left pontine lacunar type infarct.            Electronically signed by: DIEGO MINER  Date:     01/19/17  Time:    05:31     Narrative:    Exam: [id number]  01/19/17  05:05:37 [id number] ([facility]) : CT HEAD WITHOUT CONTRAST    Technique:    Multiple contiguous axial images of the brain were obtained from base to the vertex without the use of intravenous contrast. Sagittal and coronal reconstruction images were formatted in postprocessing.    Comparison:    Head CT and MRI brain for/17/2014.    Findings:      There is diffuse generalized cerebral volume loss with compensatory dilatation of the ventricular system and prominence of cerebral sulci.  There is extensive hypoattenuation within the supratentorial periventricular white matter which is nonspecific likely represent sequela of chronic microvascular ischemic change.  There is a focus of hypoattenuation within the left aspect of the jose, likely coinciding with previously identified lacunar type pontine infarct seen on prior MRI examination.  Gray-white matter differentiation appears otherwise maintained.  There is no evidence of acute intracranial hemorrhage, midline shift or mass effect.  There is patchy paranasal sinus and mastoid air cell ossification.            X-Ray Chest AP Portable (Final result) Result time:  01/19/17 02:54:52    Final result by Rolando Rodarte MD (01/19/17 02:54:52)    Impression:       No acute process.              Electronically signed by: ROLANDO RODARTE MD  Date:     01/19/17  Time:    02:54     Narrative:    Exam: [id number]  01/19/17  02:43:03 [id number] ([facility]) : XR CHEST AP PORTABLE    Technique:    Single frontal chest x-ray    Comparison:     06/26/2011    Findings:      The trachea is unremarkable.  There are calcifications of the aortic knob.  The cardiomediastinal silhouette is within normal limits.  The hemidiaphragms are unremarkable.  There is no evidence of free air beneath the hemidiaphragms.  No pleural effusions are identified.  There is no evidence of a pneumothorax.  No airspace opacities are present.  There are degenerative changes in the osseous structures.            X-Ray Abdomen AP 1 View (Final result) Result time:  01/19/17 02:58:11    Final result by Rolando Rodarte MD (01/19/17 02:58:11)    Impression:       Nonspecific bowel gas pattern without evidence of obstruction.              Electronically signed by: ROLANDO RODARTE MD  Date:     01/19/17  Time:    02:58     Narrative:    Exam: [id number]  01/19/17  02:39:37 [id number] ([facility]) : XR ABDOMEN AP 1 VIEW    Technique:    Single frontal abdomen x-ray.    Comparison:    No direct comparisons are available.    Findings:      Moderate amount of stool is present throughout the colon.  There is no significant distention of the intestinal bowel loops.  There is no evidence of pneumatosis.    There are advanced degenerative changes involving the bilateral hips.  No fractures are identified.  Penile prosthetic device is partially visualized.  There are calcifications in the pelvis.            X-Ray Hips Bilateral 2 View Incl AP Pelvis (Final result) Result time:  01/19/17 02:52:04    Final result by Rolando Rodarte MD (01/19/17 02:52:04)    Impression:       Diffuse osteopenia makes evaluation difficult.    No evidence of fracture or malalignment involving either hip.    Bilateral advanced osteoarthrosis.          Electronically signed by: ROLANDO RODARTE MD  Date:     01/19/17  Time:    02:52     Narrative:    Exam: [id number]  01/19/17  02:38:53 [id number] ([facility]) : XR HIPS BILATERAL 2 VIEW INCL AP PELVIS    Technique:    Frontal and lateral radiographs of the bilateral hips.    Comparison:    12/15/2010    Findings:       Right side: There is diffuse osteopenia.  There is no evidence of a fracture fracture or dislocation involving the right hip.  There is joint space narrowing and osteophytosis involving the right hip.    Left hip: There is diffuse osteopenia making evaluation difficult.  There is no evidence of fracture or dislocation involving the left hip.  Joint space narrowing and osteophytosis are present in the left hip.    Penile prosthesis is present.  There are calcifications in the pelvis.            X-Ray Shoulder Complete 2 View Right (Final result) Result time:  01/19/17 02:07:33    Final result by John Whitman MD (01/19/17 02:07:33)    Impression:        No acute fracture.    Possible calcific tendinitis.      Electronically signed by: JOHN WHITMAN MD  Date:     01/19/17  Time:    02:07     Narrative:    History: .    RIGHT shoulder 3 views:    No fractures or dislocations. Mild degenerative changes. Small soft tissue calcification seen in the region of the biceps tendon.               Assessment:     Darius Paiz Jr. is a 86 y.o. male with:  Patient Active Problem List    Diagnosis Date Noted    Fall 01/19/2017    Elevated serum creatinine 01/19/2017    Multiple falls 01/19/2017    CVA (cerebral infarction) 04/17/2014    Gait disorder 10/07/2013    BPH (benign prostatic hyperplasia) 06/17/2013    Seizure 08/20/2012        Plan:     Falls/Deconditioning  - patient with recent increase in falls, family unable to care for at home  - SNF placement being arranged currently, unable to arrange transport and authorization tonight  - will place in observation overnight for patient safety  - PT/OT eval and treat  - check B12 level to rule out any neurologic cause    History of CVA   - on ASA, statin  - will continue  - no residual deficits    Macrocytic Anemia  - , H/H very slightly low  - will get iron studies, B12, folate    Seizure Disorder  - no active seizures  - continue home keppra    BPH  -  chronic issue  - continue home flomax    HTN  - on lopressor at home, may not be best choice in patient with falls  - will start norvasc for BP control, slightly elevated in ED    HCM  - needs influenza, pneumovax  - PCP Dr. Duncan    PPx  - lovenox    Dispo  - place in observation  - pending SNF placement    Code Status:     Full    Mookie Whitman  [facility] HO-III  [facility] Hospital Medicine Service    [facility] Medicine Hospitalist Pager numbers:   [facility] Hospitalist Medicine Team A (Joey/Jo): [phone]  [facility] Hospitalist Medicine Team B (Serenity/Jordan):  [phone]

## 2017-01-20 NOTE — CONSULTS
[facility]  Adult Nutrition  Consult Note    SUMMARY     Recommendations    Recommendation/Intervention:  1. ST consult to determine safety of po diet.   2. Pt needs ADA restricitons to diet.   3. Add Boost Glucose 1x/day.   4. Encourage good intake at meals.    Goals:   Pt will consume at least 50-75% intake at meals  Nutrition Goal Status: new     Continuum of Care Plan  Referral to Outpatient Services:  (pt to d/c on ADA diet)    Reason for Assessment  Reason for Assessment: nurse/nurse practitioner consult (dysphagia)  Relevent Medical History: 87 yo male admitted with multiple falls. PMH + for seizures, DM, dysphagia, CVA.      General Information Comments: Pt on Regular diet. pt with fair po intake. SPoke with daughter she states he consumes regualr diet at home but does use thickener in his liquids. Pt agrreable to Boost.    Nutrition Prescription Ordered  Current Diet Order: Regular     Nutrition Risk Screen  Nutrition Risk Screen: dysphagia or difficulty swallowing    Nutrition/Diet History  Patient Reported Diet/Restrictions/Preferences: general (thickened liquids)  Food Preferences: no Jainism or cultural food prefs identified  Factors Affecting Nutritional Intake: difficulty/impaired swallowing     Labs/Tests/Procedures/Meds  Pertinent Labs Reviewed: reviewed  Pertinent Labs Comments: Glu 116H, Ca 8.5L  Pertinent Medications Reviewed: reviewed  Pertinent Medications Comments: ferrous sulfate    Physical Findings  Overall Physical Appearance:  (WNL)  Tubes:  (none)  Oral/Mouth Cavity: WDL  Skin:  (Hardeep 17-intact)    Anthropometrics  Height (inches): 65 in  Weight Method: Bed Scale  Weight (kg): 53.5 kg  Ideal Body Weight (IBW), Male: 136 lb  % Ideal Body Weight, Male (lb): 86.73 lb  BMI (kg/m2): 19.63  BMI Grade: 18.5-24.9 - normal     Estimated/Assessed Needs  Weight Used For Calorie Calculations: 61.8 kg (136 lb 3.9 oz) (IBW)   Height (cm): 165.1 cm  Energy Need Method:  (1852 (39  kcal/kg IBW))   RMR (Cook-St. Jeor Equation): 1148.22  Weight Used For Protein Calculations: 61.8 kg (136 lb 3.9 oz) (IBW)  Protein Requirements: 62-74g (1.0-1.2 g/kg)    Malnutrition (Undernutrition) Diagnosis  % Intake of Estimated Energy Needs: 25 - 50%  % Meal Intake: 50%     Nutrition Diagnosis  Nutrition Problem: Inadequate energy intake  Etiology/Related To: dysphagia  Nutrition Diagnosis Signs/Symptoms As Evidenced By: pt with inadeuate po intake  Nutrition Diagnosis Status: New    Monitor and Evaluation  Food and Nutrient Intake: food and beverage intake  Food and Nutrient Adminstration: diet order  Physical Activity and Function: nutrition-related ADLs and IADLs  Anthropometric Measurements: weight  Biochemical Data, Medical Tests and Procedures: electrolyte and renal panel  Nutrition-Focused Physical Findings: overall appearance    Nutrition Risk  Level of Risk: moderate    Nutrition Follow-Up  RD Follow-up?: Yes

## 2017-01-20 NOTE — PT/OT/SLP PROGRESS
"Occupational Therapy  Treatment    Darius Paiz Jr.   MRN: [id number]   Admitting Diagnosis: B12 deficiency    OT Date of Treatment: 01/20/17   OT Start Time: 1502  OT Stop Time: 1535  OT Total Time (min): 33 min    Billable Minutes:  Therapeutic Activity 33    General Precautions: Standard, fall, hearing impaired  Orthopedic Precautions: N/A  Braces: N/A    Do you have any cultural, spiritual, Methodist conflicts, given your current situation?: no    Subjective:  Communicated with nurseLacy prior to session. Patient's daughter and S.O. Present. "I want to sit up"    Pain Rating:  (No pain at rest, but did c/o pain in R shld during WBing - rated as 3/10)           Pain Addressed: Reposition, Distraction, Cessation of Activity       Objective:  Patient found with: peripheral IV     Functional Mobility:  Bed Mobility:  Rolling/Turning Right: Minimum assistance, With side rail  Scooting/Bridging: Minimum Assistance  Supine to Sit: Maximum Assistance  Sit to Supine: Maximum Assistance, With leg lift    Transfers:   Sit <> Stand Assistance: Moderate Assistance  Sit <> Stand Assistive Device: Rolling Walker    Functional Ambulation: N/A    Activities of Daily Living:  Grooming Position: Seated, EOB  Grooming Level of Assistance: Stand by assistance  Toileting Where Assessed: Bed level  Toileting Level of Assistance: Total assistance    Balance:   Static Sit: FAIR: Maintains without assist, but unable to take any challenges   Dynamic Sit: FAIR: Cannot move trunk without losing balance  Static Stand: POOR+: Needs MINIMAL assist to maintain  Dynamic stand: POOR: N/A    Therapeutic Activities and Exercises:  Sup > sit Max (A) this date 2* patient's S.O. Attempting to help and placing patient in awkward position requiring more (A). Sat EOB x 25 mins with SBA/CGA.  Initially, S.O. Began to wash patient's face, but after encouraging pt. To complete himself, he washed face with SBA. Patient continued to  RLE off floor - " with proprioceptive input to top of leg, patient able to maintain foot on the floor. Sit <> stand using RW with mod (A) and VCs for technique. Maintained static standing balance x 2 minutes, on 2nd trial x 3 minutes.  Patient with difficulty following commands to return to supine. Patient with soiled brief - total (A) for diaper change.     AM-PAC 6 CLICK ADL   How much help from another person does this patient currently need?   1 = Unable, Total/Dependent Assistance  2 = A lot, Maximum/Moderate Assistance  3 = A little, Minimum/Contact Guard/Supervision  4 = None, Modified [place]/Independent    Putting on and taking off regular lower body clothing? : 1  Bathing (including washing, rinsing, drying)?: 2  Toileting, which includes using toilet, bedpan, or urinal? : 1  Putting on and taking off regular upper body clothing?: 2  Taking care of personal grooming such as brushing teeth?: 3  Eating meals?: 3  Total Score: 12     AM-PAC Raw Score CMS G-Code Modifier Level of Impairment Assistance   6 % Total / Unable   7 - 9 CM 80 - 100% Maximal Assist   10 - 14 CL 60 - 80% Moderate Assist   15 - 19 CK 40 - 60% Moderate Assist   20 - 22 CJ 20 - 40% Minimal Assist   23 CI 1-20% SBA / CGA   24 CH 0% Independent/ Mod I     Patient left HOB elevated with all lines intact, call button in reach, bed alarm on, nsg notified and PCT and family members present    ASSESSMENT:  Darius Paiz Jr. is a 86 y.o. male with a medical diagnosis of B12 deficiency   Patient requesting to sit up on EOB. While EOB, patient with difficulty maintaining B feet on ground. After standing trials, patient able to keep B feet on ground and with improved sit balance. Patient will benefit from skilled OT to address functional deficits.    Rehab identified problem list/impairments: Rehab identified problem list/impairments: weakness, impaired endurance, impaired self care skills, impaired functional mobilty, gait instability, decreased  safety awareness, impaired balance, pain, edema, impaired cognition, decreased coordination, decreased upper extremity function, decreased lower extremity function    Rehab potential is good.    Activity tolerance: Fair    Discharge recommendations: Discharge Facility/Level Of Care Needs: nursing facility, skilled     Barriers to discharge: Barriers to Discharge: Decreased caregiver support    Equipment recommendations: wheelchair     GOALS:   Occupational Therapy Goals        Problem: Occupational Therapy Goal    Goal Priority Disciplines Outcome Interventions   Occupational Therapy Goal     OT, PT/OT Ongoing (interventions implemented as appropriate)    Description:  Goals to be met by: 2/19     Patient will increase functional independence with ADLs by performing:    UE Dressing with Contact Guard Assistance.  LE Dressing with Moderate Assistance.  Grooming while standing with Minimal Assistance.  Toileting from toilet with Moderate Assistance for hygiene and clothing management.   Toilet transfer to toilet with Moderate Assistance.                Plan:  Patient to be seen 5 x/week to address the above listed problems via self-care/home management, therapeutic activities, therapeutic exercises  Plan of Care expires:    Plan of Care reviewed with: significant other, patient, daughter         YOLIE Taylor  01/20/2017

## 2017-01-20 NOTE — PLAN OF CARE
Problem: Occupational Therapy Goal  Goal: Occupational Therapy Goal  Goals to be met by: 2/19     Patient will increase functional independence with ADLs by performing:    UE Dressing with Contact Guard Assistance.  LE Dressing with Moderate Assistance.  Grooming while standing with Minimal Assistance.  Toileting from toilet with Moderate Assistance for hygiene and clothing management.   Toilet transfer to toilet with Moderate Assistance.   Outcome: Ongoing (interventions implemented as appropriate)  Patient requesting to sit up on EOB. While EOB, patient with difficulty maintaining B feet on ground. After standing trials, patient able to keep B feet on ground and with improved sit balance. Patient will benefit from skilled OT to address functional deficits.

## 2017-01-20 NOTE — PROGRESS NOTES
[facility] gave authorization for patient to admit to [facility] for skilled care.   made phone contact [facility] admit coordinators, Phuong and Rosalva both reported they have left the day.  Admit coordinators reported there is no one that can admit patient to [facility] until Monday, 01/23/2017.     made phone contact with daughter informing her insurance has given authorization for skilled care at [facility] but they can not accept patient until Monday.  Daughter informed authorization can be changed to another facility, [facility] has accepted patient.  Daughter reported patient can admit to [facility].    Transfer facility order faxed to [facility].  Then  received phone call from Kristine with [facility] stating patient can admit to room 229A and [facility] nurse can call report to Ramirez at [phone].     arranged for [facility] wheelchair van to transport within the hour.     made phone contact with daughter and informed her [facility] has accepted patient and transportation has been arranged for within the hour.  Daughter informed regarding observation status and moon letter will be give to patient.     met with patient and girlfriend at bedside informed them patientt can admit to [facility] today.  Patient and girlfriend agreed with admit to [facility].  Girlfriend signed patient choice form, copy given to her and original placed in chart.  Girlfriend given moon letter to give to daughter.    Bedside nurse, Lacy informed to call report to [facility] nurseRamierz at [phone] prior to discharge and [facility] is scheduled to transport within the hour.

## 2017-01-20 NOTE — PLAN OF CARE
"Problem: Fall Risk (Adult)  Goal: Absence of Falls  Patient will demonstrate the desired outcomes by discharge/transition of care.   Outcome: Ongoing (interventions implemented as appropriate)                  Problem: Patient Care Overview  Goal: Plan of Care Review  Outcome: Ongoing (interventions implemented as appropriate)  Pt AAOx4, no family members at bedside. Initial and admit assessment documented per flowsheet. Pt complains of "pain all over" but denies n/v/d and SOB. Pt incontinent of urine, diaper placed. Meds crushed and placed in applesauce and thickening applied to all liquids - pt tolerated well. Safety maintained, bed alarm on zone 2 - will cont to monitor.     Problem: Pressure Ulcer Risk (Hardeep Scale) (Adult,Obstetrics,Pediatric)  Goal: Skin Integrity  Patient will demonstrate the desired outcomes by discharge/transition of care.   Outcome: Ongoing (interventions implemented as appropriate)                    "

## 2017-01-20 NOTE — PLAN OF CARE
Problem: Patient Care Overview  Goal: Plan of Care Review  Outcome: Ongoing (interventions implemented as appropriate)  Pt. On room air in no apparent distress. Will cont. To monitor.

## 2017-01-20 NOTE — PROGRESS NOTES
TN spoke with Makenna with [facility], SNF request has now been sent to External review and answer to arrive shortly.   Makenna will update TN shortly with final decision.

## 2017-01-20 NOTE — PLAN OF CARE
Problem: Dysphagia (Adult)  Goal: Identify Related Risk Factors and Signs and Symptoms  Related risk factors and signs and symptoms are identified upon initiation of Human Response Clinical Practice Guideline (CPG)  Outcome: Ongoing (interventions implemented as appropriate)  Recommendation/Intervention:  1. ST consult to determine safety of po diet.   2. Pt needs ADA restricitons to diet.   3. Add Boost Glucose 1x/day.   4. Encourage good intake at meals.     Goals:   Pt will consume at least 50-75% intake at meals  Nutrition Goal Status: new

## 2017-01-20 NOTE — PROGRESS NOTES
Call received from Makenna with [facility], pt has been approved for Skilled therapy, Makenna will update Phuong @ [facility] admissions.  Facility transfer orders updated and sent via [facility] to [facility], Dr [place] updated of above.

## 2017-01-20 NOTE — PROGRESS NOTES
TN discussed case with Dr. Alarcon.  Pt has been accepted to [facility] pending insurance authorization. SNF request under medical review.  Informed team, pt can discharge from hospital and admit to skilled facility from home.  Dr. Alarcon states pt is not safe to discharge  Home at this time, MD will discuss with team and update TN.

## 2017-01-21 NOTE — PROGRESS NOTES
Patient is being discharged to [facility].  Report called to [facility].  Daughter and significant other at bedside.   Awaiting wheelchair per [facility] to Nursing Home.  Voiced no other concerns.

## 2017-01-23 NOTE — DISCHARGE SUMMARY
[facility] Internal Medicine Discharge Summary    Primary Team: [facility] Internal Medicine Team B  Attending Physician: Dr. Thomas Ortega  Resident: J Carlos  Intern: Manoj    Date of Admit: 1/19/2017  Date of Discharge: 1/23/2017    Discharge to: SNF  Condition: Stable for discharge    Discharge Diagnoses     Patient Active Problem List   Diagnosis    Seizure    BPH (benign prostatic hyperplasia)    Gait disorder    CVA (cerebral infarction)    Fall    Elevated serum creatinine    Multiple falls    B12 deficiency    Physical deconditioning       Consultants and Procedures     Consultants:  Nutrition (CHET Wills)    Procedures:   None    Brief History of Present Illness      Darius Paiz Jr. is a 86 y.o. 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 The patient presented to [facility] on 1/19/2017 with a primary complaint of Fall (fall earlier today, denies loc. reports difficulty walking and pain to his right shoulder. )     The patient was in their usual state of health until the last few weeks when his family noted that he has been becoming weaker, more fatigued, and falling more frequently. States that he has fallen 3-4 times in the past two weeks, most recently yesterday when he could not  his walker anymore and fell to the floor, injuring his shoulder. Patient was brought to the ED and arrangements are being made for skilled nursing facility but patient was unable to return home out of fear for his own safety. Patient denies any fevers, chills, nausea/vomiting.    For the full HPI please refer to the History & Physical from this admission.    Hospital Course By Problem with  Pertinent Findings     Falls  - Likely secondary to deconditioning vs B12 Deficiency  - Patient with recent increase in falls, family unable to care for at home  - SNF placement being arranged currently  - PT/OT recommend SNF at discharge  - Continued PT/OT, B12 level severely low, continue repletion      Macrocytic Anemia 2/2 B12 Deficiency  - , H/H 12.2/35.8  - May be significant contributor to falls  - B12 severely depleted, giveN IM injection x1, started PO repletion on discharge      History of CVA   - No residual deficits  - Continued home aspirin statin      Seizure Disorder  - no active seizures  - continued home keppra      BPH  - chronic issue  - continued home flomax      Hx HTN  - On lopressor at home, will discontinue this medication due to patient having falls  - Started norvasc for BP control,    Discharge Medications        Medication List      START taking these medications          amlodipine 5 MG tablet   Commonly known as:  NORVASC   Take 1 tablet (5 mg total) by mouth once daily.       cyanocobalamin 1000 MCG tablet   Commonly known as:  VITAMIN B-12   Take 1 tablet (1,000 mcg total) by mouth once daily.       docusate sodium 100 MG capsule   Commonly known as:  COLACE   Take 1 capsule (100 mg total) by mouth 2 (two) times daily.         CHANGE how you take these medications          * aspirin 325 MG tablet   Take 1 tablet (325 mg total) by mouth once daily.   What changed:  Another medication with the same name was added. Make sure you understand how and when to take each.       * aspirin 81 MG EC tablet   Commonly known as:  ECOTRIN   Take 1 tablet (81 mg total) by mouth once daily.   What changed:  You were already taking a medication with the same name, and this prescription was added. Make sure you understand how and when to take each.       levetiracetam 500 MG Tab   Commonly known as:  KEPPRA   TAKE 1 TABLET(500 MG) BY MOUTH TWICE DAILY   What changed:  Another medication with the  same name was removed. Continue taking this medication, and follow the directions you see here.       * Notice:  This list has 2 medication(s) that are the same as other medications prescribed for you. Read the directions carefully, and ask your doctor or other care provider to review them with you.      CONTINUE taking these medications          ferrous sulfate 324 mg (65 mg iron) Tbec       hydrOXYzine HCl 25 MG tablet   Commonly known as:  ATARAX       metoprolol tartrate 25 MG tablet   Commonly known as:  LOPRESSOR       tamsulosin 0.4 mg Cp24   Commonly known as:  FLOMAX            Where to Get Your Medications      You can get these medications from any pharmacy     Bring a paper prescription for each of these medications     amlodipine 5 MG tablet       You don't need a prescription for these medications     aspirin 81 MG EC tablet    cyanocobalamin 1000 MCG tablet    docusate sodium 100 MG capsule             Discharge Information:   Diet:  Cardiac    Physical Activity:  As tolerated    Instructions:  1. Take all medications as prescribed  2. Keep all follow-up appointments  3. Return to the hospital or call your primary care physicians if any worsening symptoms such as fever, chills, chest pain, shortness of breath, abdominal pain, dizziness, lightheadedness, syncope, or as needed    Follow-Up Appointments:  Follow-up Information     Follow up with [facility].    Specialties:  Nursing Home Agency, SNF Agency    Why:  SNF    Contact information:    [address] [address] [phone]              Flavio Rosales  [facility] Internal Medicine, -I

## 2017-01-24 LAB — METHYLMALONATE SERPL-SCNC: 3.72 UMOL/L

## 2017-03-11 ENCOUNTER — HOSPITAL ENCOUNTER (INPATIENT)
Facility: HOSPITAL | Age: 82
LOS: 3 days | Discharge: HOME HEALTH | DRG: 580 | End: 2017-03-15
Attending: EMERGENCY MEDICINE | Admitting: INTERNAL MEDICINE
Payer: MEDICARE

## 2017-03-11 DIAGNOSIS — S60.222A: ICD-10-CM

## 2017-03-11 DIAGNOSIS — E53.8 B12 DEFICIENCY: ICD-10-CM

## 2017-03-11 DIAGNOSIS — L08.9: ICD-10-CM

## 2017-03-11 DIAGNOSIS — M79.642 HAND PAIN, LEFT: ICD-10-CM

## 2017-03-11 DIAGNOSIS — Z86.73 HISTORY OF CVA (CEREBROVASCULAR ACCIDENT): ICD-10-CM

## 2017-03-11 DIAGNOSIS — I10 ESSENTIAL HYPERTENSION: ICD-10-CM

## 2017-03-11 DIAGNOSIS — L03.114 CELLULITIS OF HAND, LEFT: ICD-10-CM

## 2017-03-11 DIAGNOSIS — N40.0 BENIGN PROSTATIC HYPERPLASIA, PRESENCE OF LOWER URINARY TRACT SYMPTOMS UNSPECIFIED, UNSPECIFIED MORPHOLOGY: ICD-10-CM

## 2017-03-11 DIAGNOSIS — R56.9 SEIZURE: ICD-10-CM

## 2017-03-11 LAB
ALBUMIN SERPL BCP-MCNC: 2.9 G/DL
ALP SERPL-CCNC: 104 U/L
ALT SERPL W/O P-5'-P-CCNC: 13 U/L
ANION GAP SERPL CALC-SCNC: 8 MMOL/L
AST SERPL-CCNC: 13 U/L
BASOPHILS # BLD AUTO: 0.04 K/UL
BASOPHILS NFR BLD: 0.4 %
BILIRUB SERPL-MCNC: 0.7 MG/DL
BUN SERPL-MCNC: 24 MG/DL
CALCIUM SERPL-MCNC: 8.7 MG/DL
CHLORIDE SERPL-SCNC: 108 MMOL/L
CO2 SERPL-SCNC: 19 MMOL/L
CREAT SERPL-MCNC: 1.3 MG/DL
CRP SERPL-MCNC: 37 MG/L
DIFFERENTIAL METHOD: ABNORMAL
EOSINOPHIL # BLD AUTO: 0.5 K/UL
EOSINOPHIL NFR BLD: 5.7 %
ERYTHROCYTE [DISTWIDTH] IN BLOOD BY AUTOMATED COUNT: 12.3 %
EST. GFR  (AFRICAN AMERICAN): 57 ML/MIN/1.73 M^2
EST. GFR  (NON AFRICAN AMERICAN): 49 ML/MIN/1.73 M^2
GLUCOSE SERPL-MCNC: 140 MG/DL
HCT VFR BLD AUTO: 32 %
HGB BLD-MCNC: 10.6 G/DL
LACTATE SERPL-SCNC: 1.3 MMOL/L
LYMPHOCYTES # BLD AUTO: 2 K/UL
LYMPHOCYTES NFR BLD: 21.2 %
MCH RBC QN AUTO: 32.4 PG
MCHC RBC AUTO-ENTMCNC: 33.1 %
MCV RBC AUTO: 98 FL
MONOCYTES # BLD AUTO: 1.1 K/UL
MONOCYTES NFR BLD: 11.9 %
NEUTROPHILS # BLD AUTO: 5.7 K/UL
NEUTROPHILS NFR BLD: 60.5 %
PLATELET # BLD AUTO: 221 K/UL
PMV BLD AUTO: 9.3 FL
POTASSIUM SERPL-SCNC: 4.6 MMOL/L
PROT SERPL-MCNC: 7.3 G/DL
RBC # BLD AUTO: 3.27 M/UL
SODIUM SERPL-SCNC: 135 MMOL/L
WBC # BLD AUTO: 9.47 K/UL

## 2017-03-11 PROCEDURE — 96367 TX/PROPH/DG ADDL SEQ IV INF: CPT

## 2017-03-11 PROCEDURE — 25000003 PHARM REV CODE 250: Performed by: EMERGENCY MEDICINE

## 2017-03-11 PROCEDURE — 87040 BLOOD CULTURE FOR BACTERIA: CPT

## 2017-03-11 PROCEDURE — 85652 RBC SED RATE AUTOMATED: CPT

## 2017-03-11 PROCEDURE — 63600175 PHARM REV CODE 636 W HCPCS: Performed by: EMERGENCY MEDICINE

## 2017-03-11 PROCEDURE — 80053 COMPREHEN METABOLIC PANEL: CPT

## 2017-03-11 PROCEDURE — 86140 C-REACTIVE PROTEIN: CPT

## 2017-03-11 PROCEDURE — 83605 ASSAY OF LACTIC ACID: CPT

## 2017-03-11 PROCEDURE — 99284 EMERGENCY DEPT VISIT MOD MDM: CPT

## 2017-03-11 PROCEDURE — 96365 THER/PROPH/DIAG IV INF INIT: CPT

## 2017-03-11 PROCEDURE — 85025 COMPLETE CBC W/AUTO DIFF WBC: CPT

## 2017-03-11 PROCEDURE — 96366 THER/PROPH/DIAG IV INF ADDON: CPT

## 2017-03-11 RX ADMIN — PIPERACILLIN SODIUM AND TAZOBACTAM SODIUM 4.5 G: 4; .5 INJECTION, POWDER, FOR SOLUTION INTRAVENOUS at 11:03

## 2017-03-11 NOTE — IP AVS SNAPSHOT
[facility]  [address]  [place] [address]  Phone: [phone]           Patient Discharge Instructions     Our goal is to set you up for success. This packet includes information on your condition, medications, and your home care. It will help you to care for yourself so you don't get sicker and need to go back to the hospital.     Please ask your nurse if you have any questions.        There are many details to remember when preparing to leave the hospital. Here is what you will need to do:    1. Take your medicine. If you are prescribed medications, review your Medication List in the following pages. You may have new medications to  at the pharmacy and others that you'll need to stop taking. Review the instructions for how and when to take your medications. Talk with your doctor or nurses if you are unsure of what to do.     2. Go to your follow-up appointments. Specific follow-up information is listed in the following pages. Your may be contacted by a transition nurse or clinical provider about future appointments. Be sure we have all of the phone numbers to reach you, if needed. Please contact your provider's office if you are unable to make an appointment.     3. Watch for warning signs. Your doctor or nurse will give you detailed warning signs to watch for and when to call for assistance. These instructions may also include educational information about your condition. If you experience any of warning signs to your health, call your doctor.               ** Verify the list of medication(s) below is accurate and up to date. Carry this with you in case of emergency. If your medications have changed, please notify your healthcare provider.             Medication List      START taking these medications        Additional Info                      clindamycin 300 MG capsule   Commonly known as:  CLEOCIN   Quantity:  7 capsule   Refills:  0   Dose:  300 mg    Instructions:  Take 1  capsule (300 mg total) by mouth 3 (three) times daily.     Begin Date    AM    Noon    PM    Bedtime         CHANGE how you take these medications        Additional Info                      aspirin 81 MG EC tablet   Commonly known as:  ECOTRIN   Quantity:  30 tablet   Refills:  2   Dose:  81 mg   What changed:  how much to take    Last time this was given:  81 mg on 3/15/2017  9:20 AM   Instructions:  Take 1 tablet (81 mg total) by mouth once daily.     Begin Date    AM    Noon    PM    Bedtime       ferrous sulfate 324 mg (65 mg iron) Tbec   Quantity:  60 tablet   Refills:  2   Dose:  325 mg   What changed:  when to take this    Last time this was given:  325 mg on 3/15/2017  9:20 AM   Instructions:  Take 1 tablet (325 mg total) by mouth 2 (two) times daily.     Begin Date    AM    Noon    PM    Bedtime       hydrOXYzine HCl 25 MG tablet   Commonly known as:  ATARAX   Quantity:  30 tablet   Refills:  0   Dose:  25 mg   What changed:    - how much to take  - how to take this    Instructions:  Take 1 tablet (25 mg total) by mouth 3 (three) times daily as needed.     Begin Date    AM    Noon    PM    Bedtime       levetiracetam 500 MG Tab   Commonly known as:  KEPPRA   Quantity:  60 tablet   Refills:  2   Dose:  500 mg   What changed:  See the new instructions.    Last time this was given:  500 mg on 3/15/2017  9:20 AM   Instructions:  Take 1 tablet (500 mg total) by mouth 2 (two) times daily.     Begin Date    AM    Noon    PM    Bedtime         CONTINUE taking these medications        Additional Info                      amlodipine 5 MG tablet   Commonly known as:  NORVASC   Quantity:  90 tablet   Refills:  3   Dose:  5 mg    Last time this was given:  5 mg on 3/15/2017  9:20 AM   Instructions:  Take 1 tablet (5 mg total) by mouth once daily.     Begin Date    AM    Noon    PM    Bedtime       cyanocobalamin 1000 MCG tablet   Commonly known as:  VITAMIN B-12   Quantity:  30 tablet   Refills:  2   Dose:  1000 mcg     Last time this was given:  1,000 mcg on 3/15/2017  9:20 AM   Instructions:  Take 1 tablet (1,000 mcg total) by mouth once daily.     Begin Date    AM    Noon    PM    Bedtime       docusate sodium 100 MG capsule   Commonly known as:  COLACE   Quantity:  30 capsule   Refills:  2   Dose:  100 mg    Last time this was given:  100 mg on 3/15/2017  9:20 AM   Instructions:  Take 1 capsule (100 mg total) by mouth 2 (two) times daily.     Begin Date    AM    Noon    PM    Bedtime       metoprolol tartrate 25 MG tablet   Commonly known as:  LOPRESSOR   Quantity:  60 tablet   Refills:  2   Dose:  25 mg    Last time this was given:  25 mg on 3/15/2017  9:20 AM   Instructions:  Take 1 tablet (25 mg total) by mouth 2 (two) times daily.     Begin Date    AM    Noon    PM    Bedtime       tamsulosin 0.4 mg Cp24   Commonly known as:  FLOMAX   Quantity:  30 capsule   Refills:  2   Dose:  0.4 mg    Last time this was given:  0.4 mg on 3/15/2017  9:20 AM   Instructions:  Take 1 capsule (0.4 mg total) by mouth once daily.     Begin Date    AM    Noon    PM    Bedtime            Where to Get Your Medications      You can get these medications from any pharmacy     Bring a paper prescription for each of these medications     amlodipine 5 MG tablet    aspirin 81 MG EC tablet    clindamycin 300 MG capsule    cyanocobalamin 1000 MCG tablet    docusate sodium 100 MG capsule    ferrous sulfate 324 mg (65 mg iron) Tbec    hydrOXYzine HCl 25 MG tablet    levetiracetam 500 MG Tab    metoprolol tartrate 25 MG tablet    tamsulosin 0.4 mg Cp24                  Please bring to all follow up appointments:    1. A copy of your discharge instructions.  2. All medicines you are currently taking in their original bottles.  3. Identification and insurance card.    Please arrive 15 minutes ahead of scheduled appointment time.    Please call 24 hours in advance if you must reschedule your appointment and/or time.        Your Scheduled Appointments     Mar  "20, 2017 10:00 AM CDT   Post OP with Juan Manuel Vidal Jr., MD   [facility]    [address]  [place] [id number] [phone]              Follow-up Information     Follow up with Kaleigh Duncan MD.    Specialty:  Family Medicine    Contact information:    [address]  [address]  [place] [address]  [phone]          Follow up with Juan Manuel Vidal Jr, MD On 3/20/2017.    Specialties:  Hand Surgery, Orthopedic Surgery    Why:   at 10 am for Post op follow up    Contact information:    [address]  [address]  [place] [address]  [phone]          Follow up with Mookie Hollis MD.    Specialty:  Neurology    Why:  Follow up    Contact information:    [address] [facility]  [place] [address]  [phone]          Follow up with Christian Cadena MD.    Specialty:  Dermatology    Contact information:    [address]  [place] [id number] [phone]          Follow up with [facility].    Contact information:    [phone]        Follow up with Juan Manuel Vidal Jr, MD. Schedule an appointment as soon as possible for a visit in 1 week.    Specialties:  Hand Surgery, Orthopedic Surgery    Why:  For suture removal    Contact information:    [address]  [address]  [place] [id number] [phone]        Referrals     Future Orders    Ambulatory referral to Home Health         Discharge Instructions     Future Orders    Activity as tolerated     Diet general     Questions:    Total calories:      Fat restriction, if any:      Protein restriction, if any:      Na restriction, if any:      Fluid restriction:      Additional restrictions:      WHEELCHAIR FOR HOME USE     Questions:    Hours in W/C per day:  10    Type of Wheelchair:  Standard    Size(Width):  18"(STD adult)    Leg Support:  STD footrests    Arm Height:      Desired seat depth:      Back height:      Lower leg length:      Actual seat depth:      Lap Belt:  Buckle    " "Accessories:      Cushion:  Basic    Justification for cushion:      Height:  5' 3" (1.6 m)    Weight:  57.2 kg (126 lb)    Does patient have medical equipment at home?:  rollator    bedside commode    grab bar    Length of need (1-99 months):  99    Please check all that apply:  Patient mobility limitations cannot be sufficiently resolved by the use of other ambulatory therapies.        Discharge Instructions       If you are over 60 and live in [facility] and are in need of extra services dealing with your health issues or those of your loved one, please call the [facility] at [phone].  They may be able to assist you with nutrition, socialization, transportation, regular exercise, medication management and more.    Discharge References/Attachments     CELLULITIS, DISCHARGE INSTRUCTIONS FOR (ENGLISH)    BPH (ENLARGED PROSTATE) (ENGLISH)    HYPERTENSION, ESTABLISHED (ENGLISH)    CVA, COMPLETED (ENGLISH)    STROKE AND HIGH BLOOD PRESSURE, UNDERSTANDING THE LINK BETWEEN (ENGLISH)    DIABETES, DIET (ENGLISH)    DIABETES, GENERAL INFORMATION (ENGLISH)    ABSCESS, INCISION AND DRAINAGE (ENGLISH)    AMLODIPINE TABLETS (ENGLISH)    ACETAMINOPHEN; OXYCODONE TABLETS (ENGLISH)    ASPIRIN CAPSULES OR TABLETS EXTENDED RELEASE (ENGLISH)    DOCUSATE CAPSULES (ENGLISH)    IRON TABLETS, CAPSULES, EXTENDED-RELEASE TABLETS (ENGLISH)    HYDROXYZINE ORAL SOLUTION (ENGLISH)    LEVETIRACETAM TABLETS (ENGLISH)    METOPROLOL TABLETS (ENGLISH)    TAMSULOSIN CAPSULES (ENGLISH)    CLINDAMYCIN CAPSULES (ENGLISH)    CYANOCOBALAMIN, VITAMIN B12 INJECTION (ENGLISH)        Primary Diagnosis     Your primary diagnosis was:  Traumatic Hematoma Of Hand With Infection      Admission Information     Date & Time Provider Department [facility]    3/11/2017  9:44 PM Deyanira Cook MD [facility] [id number]      Care Providers     Provider Role Specialty Primary office phone    Deyanira Cook MD Attending Provider " "Internal Medicine [phone]    Deyanira Cook MD Team Attending  Internal Medicine [phone]    Juan Manuel Vidal Jr., MD Consulting Physician  Hand Surgery [phone]    Juan Manuel Vidal Jr., MD Surgeon  Hand Surgery [phone]      Important Medicare Message          Most Recent Value    Important Message from Medicare Regarding Discharge Appeal Rights  Given to patient/caregiver, Explained to patient/caregiver, Signed/date by patient/caregiver, Other (comments) [patient lethargic, left vm for daughter.] yes 03/14/2017 1510      Your Vitals Were     BP Pulse Temp Resp Height Weight    133/61 95 97.6 °F (36.4 °C) (Oral) 18 5' 3" (1.6 m) 57.2 kg (126 lb)    SpO2 BMI             97% 22.32 kg/m2         Recent Lab Values        5/26/2011 5/28/2011 5/29/2011 5/30/2011 6/9/2011 7/8/2011 4/17/2014 3/12/2017      2:36 AM  3:11 AM  3:08 AM  3:25 AM  5:51 AM  4:45 AM 10:32 PM  8:27 AM    A1C 5.6 5.8 5.8 6.0 6.4 (H) 7.1 (H) 6.3 (H) 6.7 (H)    Comment for A1C at  8:27 AM on 3/12/2017:  According to ADA guidelines, hemoglobin A1C <7.0% represents  optimal control in non-pregnant diabetic patients.  Different  metrics may apply to specific populations.   Standards of Medical Care in Diabetes - 2016.  For the purpose of screening for the presence of diabetes:  <5.7%     Consistent with the absence of diabetes  5.7-6.4%  Consistent with increasing risk for diabetes   (prediabetes)  >or=6.5%  Consistent with diabetes  Currently no consensus exists for use of hemoglobin A1C  for diagnosis of diabetes for children.        Pending Labs     Order Current Status    Aerobic culture Preliminary result    Blood Culture #1 **CANNOT BE ORDERED STAT** Preliminary result    Blood Culture #2 **CANNOT BE ORDERED STAT** Preliminary result    Culture, Anaerobe Preliminary result      Allergies as of 3/15/2017     No Known Allergies      [facility]     [facility] Nurse Care Line - 24/7 Assistance  Unless otherwise directed by " your provider, please contact [facility], our nurse care line that is available for 24/7 assistance.     Registered nurses in the [facility] provide clinical advisement, health education, appointment booking, and other advisory services.  Call for this free service at [phone].        Advance Directives     An advance directive is a document which, in the event you are no longer able to make decisions for yourself, tells your healthcare team what kind of treatment you do or do not want to receive, or who you would like to make those decisions for you.  If you do not currently have an advance directive, [facility] encourages you to create one.  For more information call:  [phone]), [phone] ([phone]),  or log on to [email or web address].        Language Assistance Services     ATTENTION: Language assistance services are available, free of charge. Please call [phone].      ATENCIÓN: Si habla español, tiene a pina disposición servicios gratuitos de asistencia lingüística. Llame al [phone].     CHÚ Ý: N?u b?n nói Ti?ng Vi?t, có các d?ch v? h? tr? ngôn ng? mi?n phí dành cho b?n. G?i s? [phone].        Stroke Education              [facility] Sign-Up     Activating your [facility] account is as easy as 1-2-3!     1) Visit [email or web address], select Sign Up Now, enter this activation code and your date of birth, then select Next.  [email or web address]  Expires: 4/29/2017  2:15 PM      2) Create a username and password to use when you visit [facility] in the future and select a security question in case you lose your password and select Next.    3) Enter your e-mail address and click Sign Up!    Additional Information  If you have questions, please e-mail [email or web address] or call [phone] to talk to our [facility] staff. Remember, [facility] is NOT to be used for urgent needs. For medical emergencies, dial 911.          [facility] complies  with applicable Federal civil rights laws and does not discriminate on the basis of race, color, national origin, age, disability, or sex.

## 2017-03-12 PROBLEM — I10 ESSENTIAL HYPERTENSION: Status: ACTIVE | Noted: 2017-03-12

## 2017-03-12 PROBLEM — M79.642 HAND PAIN, LEFT: Status: ACTIVE | Noted: 2017-03-12

## 2017-03-12 PROBLEM — S60.229A: Status: ACTIVE | Noted: 2017-03-12

## 2017-03-12 PROBLEM — L03.114 CELLULITIS OF HAND, LEFT: Status: ACTIVE | Noted: 2017-03-12

## 2017-03-12 PROBLEM — L08.9: Status: ACTIVE | Noted: 2017-03-12

## 2017-03-12 PROBLEM — Z86.73 HISTORY OF CVA (CEREBROVASCULAR ACCIDENT): Status: ACTIVE | Noted: 2017-03-12

## 2017-03-12 LAB
ALBUMIN SERPL BCP-MCNC: 2.6 G/DL
ALP SERPL-CCNC: 91 U/L
ALT SERPL W/O P-5'-P-CCNC: 11 U/L
ANION GAP SERPL CALC-SCNC: 8 MMOL/L
AST SERPL-CCNC: 14 U/L
BASOPHILS # BLD AUTO: 0.06 K/UL
BASOPHILS NFR BLD: 0.7 %
BILIRUB SERPL-MCNC: 0.8 MG/DL
BUN SERPL-MCNC: 21 MG/DL
CALCIUM SERPL-MCNC: 8.5 MG/DL
CHLORIDE SERPL-SCNC: 109 MMOL/L
CHOLEST/HDLC SERPL: 2.7 {RATIO}
CO2 SERPL-SCNC: 19 MMOL/L
CREAT SERPL-MCNC: 1.3 MG/DL
DIFFERENTIAL METHOD: ABNORMAL
EOSINOPHIL # BLD AUTO: 0.6 K/UL
EOSINOPHIL NFR BLD: 6.4 %
ERYTHROCYTE [DISTWIDTH] IN BLOOD BY AUTOMATED COUNT: 12.2 %
ERYTHROCYTE [SEDIMENTATION RATE] IN BLOOD BY WESTERGREN METHOD: 118 MM/HR
EST. GFR  (AFRICAN AMERICAN): 57 ML/MIN/1.73 M^2
EST. GFR  (NON AFRICAN AMERICAN): 49 ML/MIN/1.73 M^2
GLUCOSE SERPL-MCNC: 95 MG/DL
HCT VFR BLD AUTO: 28.4 %
HDL/CHOLESTEROL RATIO: 37.3 %
HDLC SERPL-MCNC: 134 MG/DL
HDLC SERPL-MCNC: 50 MG/DL
HGB BLD-MCNC: 9.4 G/DL
LDLC SERPL CALC-MCNC: 71.8 MG/DL
LYMPHOCYTES # BLD AUTO: 2.1 K/UL
LYMPHOCYTES NFR BLD: 23.1 %
MCH RBC QN AUTO: 32 PG
MCHC RBC AUTO-ENTMCNC: 33.1 %
MCV RBC AUTO: 97 FL
MONOCYTES # BLD AUTO: 1.2 K/UL
MONOCYTES NFR BLD: 12.9 %
NEUTROPHILS # BLD AUTO: 5.2 K/UL
NEUTROPHILS NFR BLD: 56.6 %
NONHDLC SERPL-MCNC: 84 MG/DL
PLATELET # BLD AUTO: 207 K/UL
PMV BLD AUTO: 9.2 FL
POTASSIUM SERPL-SCNC: 4.2 MMOL/L
PROT SERPL-MCNC: 6.4 G/DL
RBC # BLD AUTO: 2.94 M/UL
SODIUM SERPL-SCNC: 136 MMOL/L
TRIGL SERPL-MCNC: 61 MG/DL
TROPONIN I SERPL DL<=0.01 NG/ML-MCNC: 0.01 NG/ML
TSH SERPL DL<=0.005 MIU/L-ACNC: 2.82 UIU/ML
WBC # BLD AUTO: 9.09 K/UL

## 2017-03-12 PROCEDURE — 25000003 PHARM REV CODE 250: Performed by: INTERNAL MEDICINE

## 2017-03-12 PROCEDURE — 63600175 PHARM REV CODE 636 W HCPCS: Performed by: STUDENT IN AN ORGANIZED HEALTH CARE EDUCATION/TRAINING PROGRAM

## 2017-03-12 PROCEDURE — 25000003 PHARM REV CODE 250: Performed by: STUDENT IN AN ORGANIZED HEALTH CARE EDUCATION/TRAINING PROGRAM

## 2017-03-12 PROCEDURE — 84484 ASSAY OF TROPONIN QUANT: CPT

## 2017-03-12 PROCEDURE — 94761 N-INVAS EAR/PLS OXIMETRY MLT: CPT

## 2017-03-12 PROCEDURE — 85025 COMPLETE CBC W/AUTO DIFF WBC: CPT

## 2017-03-12 PROCEDURE — 84443 ASSAY THYROID STIM HORMONE: CPT

## 2017-03-12 PROCEDURE — 36415 COLL VENOUS BLD VENIPUNCTURE: CPT

## 2017-03-12 PROCEDURE — 63600175 PHARM REV CODE 636 W HCPCS: Performed by: EMERGENCY MEDICINE

## 2017-03-12 PROCEDURE — 93005 ELECTROCARDIOGRAM TRACING: CPT

## 2017-03-12 PROCEDURE — 80061 LIPID PANEL: CPT

## 2017-03-12 PROCEDURE — 80053 COMPREHEN METABOLIC PANEL: CPT

## 2017-03-12 PROCEDURE — 25000003 PHARM REV CODE 250: Performed by: EMERGENCY MEDICINE

## 2017-03-12 PROCEDURE — 83036 HEMOGLOBIN GLYCOSYLATED A1C: CPT

## 2017-03-12 PROCEDURE — 11000001 HC ACUTE MED/SURG PRIVATE ROOM

## 2017-03-12 PROCEDURE — 93010 ELECTROCARDIOGRAM REPORT: CPT | Mod: ,,, | Performed by: INTERNAL MEDICINE

## 2017-03-12 RX ORDER — ACETAMINOPHEN 325 MG/1
650 TABLET ORAL ONCE
Status: COMPLETED | OUTPATIENT
Start: 2017-03-12 | End: 2017-03-12

## 2017-03-12 RX ORDER — GLUCAGON 1 MG
1 KIT INJECTION
Status: DISCONTINUED | OUTPATIENT
Start: 2017-03-12 | End: 2017-03-15 | Stop reason: HOSPADM

## 2017-03-12 RX ORDER — ACETAMINOPHEN 650 MG/20.3ML
650 LIQUID ORAL EVERY 6 HOURS PRN
Status: DISCONTINUED | OUTPATIENT
Start: 2017-03-12 | End: 2017-03-15 | Stop reason: HOSPADM

## 2017-03-12 RX ORDER — ATORVASTATIN CALCIUM 40 MG/1
40 TABLET, FILM COATED ORAL DAILY
Status: DISCONTINUED | OUTPATIENT
Start: 2017-03-12 | End: 2017-03-15 | Stop reason: HOSPADM

## 2017-03-12 RX ORDER — LANOLIN ALCOHOL/MO/W.PET/CERES
1000 CREAM (GRAM) TOPICAL DAILY
Status: DISCONTINUED | OUTPATIENT
Start: 2017-03-12 | End: 2017-03-15 | Stop reason: HOSPADM

## 2017-03-12 RX ORDER — FERROUS SULFATE 325(65) MG
325 TABLET, DELAYED RELEASE (ENTERIC COATED) ORAL 2 TIMES DAILY
Status: DISCONTINUED | OUTPATIENT
Start: 2017-03-12 | End: 2017-03-15 | Stop reason: HOSPADM

## 2017-03-12 RX ORDER — OXYCODONE AND ACETAMINOPHEN 5; 325 MG/1; MG/1
1 TABLET ORAL EVERY 6 HOURS PRN
Status: DISCONTINUED | OUTPATIENT
Start: 2017-03-12 | End: 2017-03-15 | Stop reason: HOSPADM

## 2017-03-12 RX ORDER — ASPIRIN 81 MG/1
81 TABLET ORAL DAILY
Status: DISCONTINUED | OUTPATIENT
Start: 2017-03-12 | End: 2017-03-15 | Stop reason: HOSPADM

## 2017-03-12 RX ORDER — TAMSULOSIN HYDROCHLORIDE 0.4 MG/1
0.4 CAPSULE ORAL DAILY
Status: DISCONTINUED | OUTPATIENT
Start: 2017-03-12 | End: 2017-03-15 | Stop reason: HOSPADM

## 2017-03-12 RX ORDER — HEPARIN SODIUM 5000 [USP'U]/ML
5000 INJECTION, SOLUTION INTRAVENOUS; SUBCUTANEOUS EVERY 12 HOURS
Status: DISCONTINUED | OUTPATIENT
Start: 2017-03-12 | End: 2017-03-15 | Stop reason: HOSPADM

## 2017-03-12 RX ORDER — DOCUSATE SODIUM 100 MG/1
100 CAPSULE, LIQUID FILLED ORAL 2 TIMES DAILY
Status: DISCONTINUED | OUTPATIENT
Start: 2017-03-12 | End: 2017-03-15 | Stop reason: HOSPADM

## 2017-03-12 RX ORDER — ACETAMINOPHEN 325 MG/1
650 TABLET ORAL ONCE
Status: DISCONTINUED | OUTPATIENT
Start: 2017-03-12 | End: 2017-03-12

## 2017-03-12 RX ORDER — IBUPROFEN 200 MG
16 TABLET ORAL
Status: DISCONTINUED | OUTPATIENT
Start: 2017-03-12 | End: 2017-03-15 | Stop reason: HOSPADM

## 2017-03-12 RX ORDER — AMLODIPINE BESYLATE 5 MG/1
5 TABLET ORAL DAILY
Status: DISCONTINUED | OUTPATIENT
Start: 2017-03-12 | End: 2017-03-15 | Stop reason: HOSPADM

## 2017-03-12 RX ORDER — METOPROLOL TARTRATE 25 MG/1
25 TABLET, FILM COATED ORAL 2 TIMES DAILY
Status: DISCONTINUED | OUTPATIENT
Start: 2017-03-12 | End: 2017-03-15 | Stop reason: HOSPADM

## 2017-03-12 RX ORDER — IBUPROFEN 200 MG
24 TABLET ORAL
Status: DISCONTINUED | OUTPATIENT
Start: 2017-03-12 | End: 2017-03-15 | Stop reason: HOSPADM

## 2017-03-12 RX ORDER — LEVETIRACETAM 500 MG/1
500 TABLET ORAL 2 TIMES DAILY
Status: DISCONTINUED | OUTPATIENT
Start: 2017-03-12 | End: 2017-03-15 | Stop reason: HOSPADM

## 2017-03-12 RX ADMIN — PIPERACILLIN SODIUM AND TAZOBACTAM SODIUM 4.5 G: 4; .5 INJECTION, POWDER, FOR SOLUTION INTRAVENOUS at 10:03

## 2017-03-12 RX ADMIN — AMLODIPINE BESYLATE 5 MG: 5 TABLET ORAL at 10:03

## 2017-03-12 RX ADMIN — METOPROLOL TARTRATE 25 MG: 25 TABLET ORAL at 12:03

## 2017-03-12 RX ADMIN — TAMSULOSIN HYDROCHLORIDE 0.4 MG: 0.4 CAPSULE ORAL at 10:03

## 2017-03-12 RX ADMIN — HEPARIN SODIUM 5000 UNITS: 5000 INJECTION, SOLUTION INTRAVENOUS; SUBCUTANEOUS at 10:03

## 2017-03-12 RX ADMIN — DOCUSATE SODIUM 100 MG: 100 CAPSULE, LIQUID FILLED ORAL at 10:03

## 2017-03-12 RX ADMIN — PIPERACILLIN SODIUM AND TAZOBACTAM SODIUM 4.5 G: 4; .5 INJECTION, POWDER, FOR SOLUTION INTRAVENOUS at 04:03

## 2017-03-12 RX ADMIN — LEVETIRACETAM 500 MG: 500 TABLET ORAL at 09:03

## 2017-03-12 RX ADMIN — ACETAMINOPHEN 650 MG: 325 TABLET ORAL at 10:03

## 2017-03-12 RX ADMIN — ATORVASTATIN CALCIUM 40 MG: 40 TABLET, FILM COATED ORAL at 10:03

## 2017-03-12 RX ADMIN — HEPARIN SODIUM 5000 UNITS: 5000 INJECTION, SOLUTION INTRAVENOUS; SUBCUTANEOUS at 09:03

## 2017-03-12 RX ADMIN — FERROUS SULFATE TAB EC 325 MG (65 MG FE EQUIVALENT) 325 MG: 325 (65 FE) TABLET DELAYED RESPONSE at 09:03

## 2017-03-12 RX ADMIN — DOCUSATE SODIUM 100 MG: 100 CAPSULE, LIQUID FILLED ORAL at 09:03

## 2017-03-12 RX ADMIN — FERROUS SULFATE TAB EC 325 MG (65 MG FE EQUIVALENT) 325 MG: 325 (65 FE) TABLET DELAYED RESPONSE at 10:03

## 2017-03-12 RX ADMIN — ASPIRIN 81 MG: 81 TABLET, COATED ORAL at 10:03

## 2017-03-12 RX ADMIN — CYANOCOBALAMIN TAB 1000 MCG 1000 MCG: 1000 TAB at 10:03

## 2017-03-12 RX ADMIN — OXYCODONE AND ACETAMINOPHEN 1 TABLET: 5; 325 TABLET ORAL at 12:03

## 2017-03-12 RX ADMIN — METOPROLOL TARTRATE 25 MG: 25 TABLET ORAL at 09:03

## 2017-03-12 RX ADMIN — LEVETIRACETAM 500 MG: 500 TABLET ORAL at 10:03

## 2017-03-12 RX ADMIN — VANCOMYCIN HYDROCHLORIDE 1000 MG: 1 INJECTION, POWDER, LYOPHILIZED, FOR SOLUTION INTRAVENOUS at 12:03

## 2017-03-12 NOTE — ED TRIAGE NOTES
Sustained skin tear on top of left hand on Wednesday while transferring out of wheelchair. Area now edematous with hematoma and old drainage.

## 2017-03-12 NOTE — H&P
U Internal Medicine History and Physical - Resident Note    Admitting Team: U Medicine Team A  Attending Physician: Dr. Cook  Resident: Dr. Gandhi  Interns: Dr. Slade & Dr. Zhou    Date of Admit: 3/11/2017    Chief Complaint     Hand Injury (Spouse accidentally scratched left hand with fingernail on Wednesday creating a skin tear to top of hand. Now swollen with hematoma. ) x 4 days.       Subjective:      History of Present Illness:  Darius Paiz Jr. is a 86 y.o.  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 The patient presented to [facility] on 3/11/2017 with a primary complaint of Hand Injury (Spouse accidentally scratched left hand with fingernail on Wednesday creating a skin tear to top of hand. Now swollen with hematoma. )    Mr. Paiz is an 87 y/o M with a PMH significant for DM II, CVA w/ dysphagia, prior subdural hematoma, BPH, Macrocytic Anemia 2/2 to B12 Deficiency, Seizure Disorder, and HTN who presents to the [facility] ED with a chief complaint of L hand injury. Patient was a poor historian and history was obtained from friend, Bryanna Lal, at bedside. Patient was in his USOH until the morning of 3/08/2017 when he was being helped up from his bed and his friend accidentally scratched the dorsal surface of his L hand with her finger nail. Shortly after, Ms. Lal noticed L hand swelling in Mr. Valentine. She states at that time she applied hydrogen peroxide and alcohol to the wound however Mr. Valentine's L hand continued to swell over the next few days. On Friday, Ms. Lal states she began to notice skin color changes in Mr. Valentine's L hand and wrist. Upon  examination, Mr. Valentine denies numbness or tingling, fevers or chills, nausea, vomiting, chest pain, or SOB.     Of note, patient was recently discharged in 01/2017 from [facility] after experiencing a fall. He successfully completed rehab at [facility] and recently returned home. At baseline, he requires assistance with all his ADL's and walks with a walker w/ assistance.     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 Authorizing Provider   amlodipine (NORVASC) 5 MG tablet Take 1 tablet (5 mg total) by mouth once daily. 1/20/17 1/20/18  Mookie Whitman MD   aspirin (ECOTRIN) 81 MG EC tablet Take 1 tablet (81 mg total) by mouth once daily.  Patient taking differently: Take 325 mg by mouth once daily.  1/20/17 1/20/18  Mookie Whitman MD   cyanocobalamin (VITAMIN B-12) 1000 MCG tablet Take 1 tablet (1,000 mcg total) by mouth once daily. 1/20/17   Mookie Whitman MD   docusate sodium (COLACE) 100 MG capsule Take 1 capsule (100 mg total) by mouth 2 (two) times daily. 1/20/17   Flavio Rosales MD   ferrous sulfate 324 mg (65 mg iron) TbEC Take 325 mg by mouth once daily.    Historical Provider, MD   hydrOXYzine (ATARAX) 25 MG tablet 3 (three) times daily as needed.  9/8/14    Historical Provider, MD   levetiracetam (KEPPRA) 500 MG Tab TAKE 1 TABLET(500 MG) BY MOUTH TWICE DAILY 16   Mookie Hollis MD   metoprolol tartrate (LOPRESSOR) 25 MG tablet Take 25 mg by mouth 2 (two) times daily.  13   Historical Provider, MD   tamsulosin (FLOMAX) 0.4 mg Cp24 Take 0.4 mg by mouth once daily.    Historical Provider, MD       Family History:  Family History   Problem Relation Age of Onset    Amblyopia Neg Hx     Blindness Neg Hx     Cancer Neg Hx     Cataracts Neg Hx     Diabetes Neg Hx     Glaucoma Neg Hx     Hypertension Neg Hx     Macular degeneration Neg Hx     Retinal detachment Neg Hx     Strabismus Neg Hx     Stroke Neg Hx     Thyroid disease Neg Hx     Prostate cancer Neg Hx     Kidney disease Neg Hx        Social History:  Social History   Substance Use Topics    Smoking status: Former Smoker     Packs/day: 0.50     Years: 40.00     Types: Cigarettes    Smokeless tobacco: Former User     Quit date: 1992    Alcohol use No       Review of Systems:  Pertinent items are noted in HPI. All other systems are reviewed and are negative.    Health Maintaince :   Primary Care Physician: Dr. Duncan  Immunizations:   TDap is not up to date.  Influenza is not up to date. Denies.  Pneumovax is not up to date. Denies.   Cancer Screening:  Colonoscopy: is up to date.      Objective:   Last 24 Hour Vital Signs:  BP  Min: 137/54  Max: 163/77  Temp  Av.8 °F (36 °C)  Min: 96.8 °F (36 °C)  Max: 96.8 °F (36 °C)  Pulse  Av.7  Min: 79  Max: 87  Resp  Av.7  Min: 16  Max: 18  SpO2  Av.3 %  Min: 99 %  Max: 100 %  Weight  Av.5 kg (118 lb)  Min: 53.5 kg (118 lb)  Max: 53.5 kg (118 lb)  Body mass index is 19.64 kg/(m^2).       Physical Examination:  General: NAD. AAOx3. Conversant but cannot re-call prior events.   HEENT: PERRL, EOMI, MMM. Prior healed conner hole on L forehead.   Respiratory: CTA bilaterally. No crackles appreciated.  Cardiovascular: S1/S2. RRR.  No murmurs appreciated.  Abdomen: Soft and non-tender. Normoactive bowel sounds.  Extremities: L Hand Hematoma noted/Edematous. 4-cm laceration noted on dorsum of L hand. Erythema noted over dorsum of hand, wrist, and forearm. Scattered echymoses noticed over UE's bilaterally. Intact sensation/motor function in L hand.  Psych: Appropriate Mood and Affect.     Laboratory:  Most Recent Data:  CBC: Lab Results   Component Value Date    WBC 9.47 03/11/2017    HGB 10.6 (L) 03/11/2017    HCT 32.0 (L) 03/11/2017     03/11/2017    MCV 98 03/11/2017    RDW 12.3 03/11/2017     BMP: Lab Results   Component Value Date     (L) 03/11/2017    K 4.6 03/11/2017     03/11/2017    CO2 19 (L) 03/11/2017    BUN 24 (H) 03/11/2017    CREATININE 1.3 03/11/2017     (H) 03/11/2017    CALCIUM 8.7 03/11/2017    MG 1.8 04/21/2014    PHOS 2.2 (L) 04/21/2014     LFTs: Lab Results   Component Value Date    PROT 7.3 03/11/2017    ALBUMIN 2.9 (L) 03/11/2017    BILITOT 0.7 03/11/2017    AST 13 03/11/2017    ALKPHOS 104 03/11/2017    ALT 13 03/11/2017     Coags:   Lab Results   Component Value Date    INR 1.0 04/17/2014     FLP: Lab Results   Component Value Date    CHOL 155 04/17/2014    HDL 51 04/17/2014    LDLCALC 93.2 04/17/2014    TRIG 54 04/17/2014    CHOLHDL 32.9 04/17/2014     DM: Lab Results   Component Value Date    HGBA1C 6.3 (H) 04/17/2014    HGBA1C 7.1 (H) 07/08/2011    HGBA1C 6.4 (H) 06/09/2011    LDLCALC 93.2 04/17/2014    CREATININE 1.3 03/11/2017     Thyroid: Lab Results   Component Value Date    TSH 2.136 04/17/2014    FREET4 1.06 06/27/2011    A9JZSYO 4.9 06/27/2011     Anemia: Lab Results   Component Value Date    IRON 36 (L) 01/19/2017    TIBC 204 (L) 01/19/2017    FERRITIN 311 (H) 01/19/2017    XHGRZVTN18 <146 (L) 01/19/2017    FOLATE 7.7 01/19/2017     Cardiac: Lab Results   Component Value Date    TROPONINI <0.006 04/17/2014    BNP 39 04/17/2014     Urinalysis: Lab Results   Component Value Date     LABURIN  01/20/2014     Multiple organisms isolated. None in predominance.  Repeat if    LABURIN clinically necessary. 01/20/2014    COLORU Yellow 01/19/2017    SPECGRAV 1.025 01/19/2017    NITRITE Negative 01/19/2017    KETONESU Negative 01/19/2017    UROBILINOGEN 1.0 01/19/2017    WBCUA 1 01/19/2017       Trended Lab Data:    Recent Labs  Lab 03/11/17  2310   WBC 9.47   HGB 10.6*   HCT 32.0*      MCV 98   RDW 12.3   *   K 4.6      CO2 19*   BUN 24*   CREATININE 1.3   *   PROT 7.3   ALBUMIN 2.9*   BILITOT 0.7   AST 13   ALKPHOS 104   ALT 13       Trended Cardiac Data:  No results for input(s): TROPONINI, CKTOTAL, CKMB, BNP in the last 168 hours.    Microbiology Data:  Blood Cultures Pending.     Other Results:  EKG (my interpretation)  Pending    Radiology:  Imaging Results     None        Assessment:     Darius Paiz Jr. is a 86 y.o. male with:  Patient Active Problem List    Diagnosis Date Noted    History of CVA (cerebrovascular accident) 03/12/2017    B12 deficiency 01/20/2017    Physical deconditioning     Fall 01/19/2017    Elevated serum creatinine 01/19/2017    Multiple falls 01/19/2017    CVA (cerebral infarction) 04/17/2014    Gait disorder 10/07/2013    BPH (benign prostatic hyperplasia) 06/17/2013    Seizure 08/20/2012        Plan:     Traumatic hematoma of L hand w/ infection  -Per patient hx - laceration to dorsum of L hand via fingernail on 03/08/2017/  -Progressive swelling noticed over last 4 days; erythema/skin changes noticed 03/10/2017.  -PE revealing hematoma/laceration noted on dorsum of L hand.   -Afebrile. WBC WNL. Sed Rate 118. Lactate 1.3.   -Blood Cultures Pending.   -Received Vancomycin/Zosyn in Ed.  -Upon transfer to the floor continuing zosyn/vancomycin. Trending lactate.   -Ordered L hand xray.   -Will consider consulting ortho in AM.    HTN  -BP upon admission 137/54.  -Continuing home amlodipine 5mg qD.  -Will continue to monitor.    Seizure  Disorder  -No acute issues.  -Continuing home keppra 500mg BID.    Anemia 2/2 B12 Deficiency  -H/H 10.6/32.0 MCV 98.  -B12 1/2017 <146. Folate 7.7  -Continuing home cyanocobalamin 1000mcg qD.   -Continuing home ferrous sulfate 325mg BID and colace 100mg BID.     Hx of CVA w/ Dysphagia  -No acute issues/no new focal deficits.  -Continuing home atorvastatin 40mg qD and ASA 81mg qD.     BPH  -Continuing home flomax 0.4mg qD.    DM Type II  -Glucose upon admission 140.   -Not on any home medications.   -Ordered repeated A1c.     HM: TSH, A1c.     Diet: NPO  DVT PPx: Heparin  Dispo: Pending improvement in L hand erythema/edema.      Code Status:     FULL    Ramses Lovell  [facility] Internal Medicine HO-I  U Medicine Service    [facility] Medicine Hospitalist Pager numbers:   [facility] Hospitalist Medicine Team A (Joey/Jo): [phone]  [facility] Hospitalist Medicine Team B (Serenity/Jordan):  [phone]

## 2017-03-12 NOTE — ED PROVIDER NOTES
Encounter Date: 3/11/2017       History     Chief Complaint   Patient presents with    Hand Injury     Spouse accidentally scratched left hand with fingernail on Wednesday creating a skin tear to top of hand. Now swollen with hematoma.      Review of patient's allergies indicates:  No Known Allergies  HPI Comments: This is an 85 y/o M with history of L hand injury on Wednesday.  According to his wife he was recently discharged from [place] after going through rehabilitation but remains weak and requires assistance with movement and transfers.  His wife was assisting him up from bed and reports her fingernail scratched the back of the L hand and he had significant bleeding and swelling over the dorsum of the L hand which now has become red and hot and more swollen with swelling extending to the digits and the distal forearm.  No fever.  Patient remains weak but this is stable according to his family.  Patient reports moderate pain in his hand worse when he tries to use the hand or fingers.       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     Elevated PSA     History of CVA (cerebrovascular accident)     History of subdural hematoma (post traumatic)     Liver disease     Seizures     Stroke     Urinary tract infection      Past Surgical History:   Procedure Laterality Date    FABIENNE HOLE FOR SUBDURAL HEMATOMA      CATARACT EXTRACTION      GASTROSTOMY TUBE PLACEMENT       Family History   Problem Relation Age of Onset    Amblyopia Neg Hx     Blindness Neg Hx     Cancer Neg Hx     Cataracts Neg Hx     Diabetes Neg Hx     Glaucoma Neg Hx     Hypertension Neg Hx     Macular degeneration Neg Hx     Retinal detachment Neg Hx     Strabismus Neg Hx     Stroke Neg Hx     Thyroid disease Neg Hx     Prostate cancer Neg Hx     Kidney disease Neg  Hx      Social History   Substance Use Topics    Smoking status: Former Smoker     Packs/day: 0.50     Years: 40.00     Types: Cigarettes    Smokeless tobacco: Former User     Quit date: 8/20/1992    Alcohol use No     Review of Systems   Constitutional: Negative for fever.   HENT: Negative for sore throat.    Respiratory: Negative for shortness of breath.    Cardiovascular: Negative for chest pain.   Gastrointestinal: Negative for nausea.   Genitourinary: Negative for dysuria.   Musculoskeletal: Negative for back pain.   Skin: Positive for wound.   Neurological: Negative for weakness.   Hematological: Does not bruise/bleed easily.   All other systems reviewed and are negative.      Physical Exam   Initial Vitals   BP Pulse Resp Temp SpO2   03/11/17 2143 03/11/17 2143 03/11/17 2143 03/11/17 2143 03/11/17 2143   163/77 82 16 96.8 °F (36 °C) 99 %     Physical Exam    Nursing note and vitals reviewed.  Constitutional: He appears well-developed and well-nourished.   Frail appearing and generally weak   HENT:   Head: Normocephalic and atraumatic.   Eyes: Conjunctivae and EOM are normal. Pupils are equal, round, and reactive to light.   Neck: Normal range of motion. Neck supple.   Cardiovascular: Normal rate, regular rhythm, normal heart sounds and intact distal pulses. Exam reveals no gallop and no friction rub.    No murmur heard.  Pulmonary/Chest: Breath sounds normal. No stridor. No respiratory distress. He has no wheezes. He has no rhonchi. He has no rales. He exhibits no tenderness.   Abdominal: Soft. Bowel sounds are normal. He exhibits no distension. There is no tenderness. There is no rebound and no guarding.   Musculoskeletal: Normal range of motion.        Right hand: Motor /Testing: The patient has normal right wrist strength.        Left hand: He exhibits normal capillary refill. Decreased sensation is not present in the ulnar distribution, is not present in the medial redistribution and is not present in  the radial distribution. Left thumb: Exhibits ecchymosis and swelling. Left index finger: Exhibits ecchymosis and swelling. Left middle finger: Exhibits ecchymosis and swelling. Left ring finger: Exhibits swelling and ecchymosis. Left little finger: Exhibits ecchymosis and swelling. Motor /Testing: The patient has normal left wrist strength. Comments: Hand is diffusely swollen. Patient is able to form a fist but has significant pain with flexion of fingers.          Hands:  Neurological: He is alert and oriented to person, place, and time. He has normal strength. No cranial nerve deficit.   Skin: Skin is warm and dry.   Psychiatric: He has a normal mood and affect.         ED Course   Procedures  Labs Reviewed   CBC W/ AUTO DIFFERENTIAL - Abnormal; Notable for the following:        Result Value    RBC 3.27 (*)     Hemoglobin 10.6 (*)     Hematocrit 32.0 (*)     MCH 32.4 (*)     Mono # 1.1 (*)     All other components within normal limits   COMPREHENSIVE METABOLIC PANEL - Abnormal; Notable for the following:     Sodium 135 (*)     CO2 19 (*)     Glucose 140 (*)     BUN, Bld 24 (*)     Albumin 2.9 (*)     eGFR if  57 (*)     eGFR if non  49 (*)     All other components within normal limits   SEDIMENTATION RATE, MANUAL - Abnormal; Notable for the following:     Sed Rate 118 (*)     All other components within normal limits   C-REACTIVE PROTEIN - Abnormal; Notable for the following:     CRP 37.0 (*)     All other components within normal limits   CULTURE, BLOOD   CULTURE, BLOOD   LACTIC ACID, PLASMA             Medical Decision Making:   Initial Assessment:   Infected hematoma with associated cellulitis following injury of L hand several days ago.  Will check labs and give IV antibiotics, probable admission due to extensive nature of swelling and involvement of the hand.     Differential Diagnosis:   Hand cellulitis, infected hematoma, abscess, foreign body, fracture, DVT  ED  Management:  Patient's labs remarkable for significantly elevated CRP and ESR but normal WBC count.  Patient will be admitted to [facility] hospitalist service for IV antibiotics and hand surgery evaluation.                    ED Course     Clinical Impression:   Diagnoses of Hand pain, left and Traumatic hematoma of hand with infection, left, initial encounter were pertinent to this visit.    Disposition:   Disposition: Admitted  Condition: Stable       Cassandra Mata MD  03/12/17 0354

## 2017-03-12 NOTE — PLAN OF CARE
Problem: Patient Care Overview  Goal: Plan of Care Review  Outcome: Ongoing (interventions implemented as appropriate)  Plan of care reviewed with patient and daughter. Voice understanding. Serosanguinous drainage noted to hand today and on patients sheets. Wound wrapped with kerlix. C/O pain today with some relief from po pain medication. Antibiotics administered per orders. Will remain NPO past midnight for consult with Dr. Vidal.

## 2017-03-12 NOTE — PROGRESS NOTES
Vancomycin dosage adjusted from 750 mg IV q12h to 750 mg IV q24h per hospital dosing protocol based on following parameters:  85 YO male, 53.5 kg, SCr 1.3 mg/dL, est. CrCl 30.9 ml/min, indication: skin/soft tissue infection.  Vancomycin trough level to be checked prior to 4th dose.

## 2017-03-13 ENCOUNTER — TELEPHONE (OUTPATIENT)
Dept: ORTHOPEDICS | Facility: CLINIC | Age: 82
End: 2017-03-13

## 2017-03-13 ENCOUNTER — ANESTHESIA (OUTPATIENT)
Dept: SURGERY | Facility: HOSPITAL | Age: 82
DRG: 580 | End: 2017-03-13
Payer: MEDICARE

## 2017-03-13 ENCOUNTER — ANESTHESIA EVENT (OUTPATIENT)
Dept: SURGERY | Facility: HOSPITAL | Age: 82
DRG: 580 | End: 2017-03-13
Payer: MEDICARE

## 2017-03-13 LAB
ALBUMIN SERPL BCP-MCNC: 2.5 G/DL
ALP SERPL-CCNC: 79 U/L
ALT SERPL W/O P-5'-P-CCNC: 9 U/L
ANION GAP SERPL CALC-SCNC: 7 MMOL/L
AST SERPL-CCNC: 11 U/L
BASOPHILS # BLD AUTO: 0.07 K/UL
BASOPHILS NFR BLD: 0.8 %
BILIRUB SERPL-MCNC: 0.9 MG/DL
BUN SERPL-MCNC: 22 MG/DL
CALCIUM SERPL-MCNC: 8.4 MG/DL
CHLORIDE SERPL-SCNC: 108 MMOL/L
CO2 SERPL-SCNC: 21 MMOL/L
CREAT SERPL-MCNC: 1.8 MG/DL
CREAT UR-MCNC: 87.2 MG/DL
DIFFERENTIAL METHOD: ABNORMAL
EOSINOPHIL # BLD AUTO: 0.5 K/UL
EOSINOPHIL NFR BLD: 5.9 %
ERYTHROCYTE [DISTWIDTH] IN BLOOD BY AUTOMATED COUNT: 12.3 %
EST. GFR  (AFRICAN AMERICAN): 39 ML/MIN/1.73 M^2
EST. GFR  (NON AFRICAN AMERICAN): 33 ML/MIN/1.73 M^2
ESTIMATED AVG GLUCOSE: 146 MG/DL
GLUCOSE SERPL-MCNC: 98 MG/DL
HBA1C MFR BLD HPLC: 6.7 %
HCT VFR BLD AUTO: 30 %
HGB BLD-MCNC: 9.8 G/DL
LYMPHOCYTES # BLD AUTO: 1.8 K/UL
LYMPHOCYTES NFR BLD: 20 %
MAGNESIUM SERPL-MCNC: 1.8 MG/DL
MCH RBC QN AUTO: 31.9 PG
MCHC RBC AUTO-ENTMCNC: 32.7 %
MCV RBC AUTO: 98 FL
MONOCYTES # BLD AUTO: 1.1 K/UL
MONOCYTES NFR BLD: 11.8 %
NEUTROPHILS # BLD AUTO: 5.6 K/UL
NEUTROPHILS NFR BLD: 61.3 %
PHOSPHATE SERPL-MCNC: 2.7 MG/DL
PLATELET # BLD AUTO: 186 K/UL
PMV BLD AUTO: 9.3 FL
POCT GLUCOSE: 109 MG/DL (ref 70–110)
POCT GLUCOSE: 122 MG/DL (ref 70–110)
POCT GLUCOSE: 169 MG/DL (ref 70–110)
POTASSIUM SERPL-SCNC: 4.2 MMOL/L
PROT SERPL-MCNC: 6.2 G/DL
RBC # BLD AUTO: 3.07 M/UL
SODIUM SERPL-SCNC: 136 MMOL/L
SODIUM UR-SCNC: 83 MMOL/L
UUN UR-MCNC: 462 MG/DL
VANCOMYCIN SERPL-MCNC: 16.4 UG/ML
WBC # BLD AUTO: 9.08 K/UL

## 2017-03-13 PROCEDURE — 25000003 PHARM REV CODE 250: Performed by: NURSE ANESTHETIST, CERTIFIED REGISTERED

## 2017-03-13 PROCEDURE — G8997 SWALLOW GOAL STATUS: HCPCS | Mod: CJ

## 2017-03-13 PROCEDURE — 63600175 PHARM REV CODE 636 W HCPCS: Performed by: NURSE ANESTHETIST, CERTIFIED REGISTERED

## 2017-03-13 PROCEDURE — 25000003 PHARM REV CODE 250: Performed by: STUDENT IN AN ORGANIZED HEALTH CARE EDUCATION/TRAINING PROGRAM

## 2017-03-13 PROCEDURE — 92609 USE OF SPEECH DEVICE SERVICE: CPT

## 2017-03-13 PROCEDURE — 80202 ASSAY OF VANCOMYCIN: CPT

## 2017-03-13 PROCEDURE — 10140 I&D HMTMA SEROMA/FLUID COLLJ: CPT | Mod: ,,, | Performed by: ORTHOPAEDIC SURGERY

## 2017-03-13 PROCEDURE — 36000705 HC OR TIME LEV I EA ADD 15 MIN: Performed by: ORTHOPAEDIC SURGERY

## 2017-03-13 PROCEDURE — 63600175 PHARM REV CODE 636 W HCPCS: Performed by: ANESTHESIOLOGY

## 2017-03-13 PROCEDURE — 36000704 HC OR TIME LEV I 1ST 15 MIN: Performed by: ORTHOPAEDIC SURGERY

## 2017-03-13 PROCEDURE — G8996 SWALLOW CURRENT STATUS: HCPCS | Mod: CL

## 2017-03-13 PROCEDURE — 82570 ASSAY OF URINE CREATININE: CPT

## 2017-03-13 PROCEDURE — 37000009 HC ANESTHESIA EA ADD 15 MINS: Performed by: ORTHOPAEDIC SURGERY

## 2017-03-13 PROCEDURE — 37000008 HC ANESTHESIA 1ST 15 MINUTES: Performed by: ORTHOPAEDIC SURGERY

## 2017-03-13 PROCEDURE — 84100 ASSAY OF PHOSPHORUS: CPT

## 2017-03-13 PROCEDURE — 87070 CULTURE OTHR SPECIMN AEROBIC: CPT

## 2017-03-13 PROCEDURE — 84540 ASSAY OF URINE/UREA-N: CPT

## 2017-03-13 PROCEDURE — 87205 SMEAR GRAM STAIN: CPT

## 2017-03-13 PROCEDURE — 84300 ASSAY OF URINE SODIUM: CPT

## 2017-03-13 PROCEDURE — 0J9K0ZZ DRAINAGE OF LEFT HAND SUBCUTANEOUS TISSUE AND FASCIA, OPEN APPROACH: ICD-10-PCS | Performed by: ORTHOPAEDIC SURGERY

## 2017-03-13 PROCEDURE — 25000003 PHARM REV CODE 250: Performed by: ORTHOPAEDIC SURGERY

## 2017-03-13 PROCEDURE — 94761 N-INVAS EAR/PLS OXIMETRY MLT: CPT

## 2017-03-13 PROCEDURE — 83735 ASSAY OF MAGNESIUM: CPT

## 2017-03-13 PROCEDURE — 97535 SELF CARE MNGMENT TRAINING: CPT

## 2017-03-13 PROCEDURE — 85025 COMPLETE CBC W/AUTO DIFF WBC: CPT

## 2017-03-13 PROCEDURE — 63600175 PHARM REV CODE 636 W HCPCS: Performed by: STUDENT IN AN ORGANIZED HEALTH CARE EDUCATION/TRAINING PROGRAM

## 2017-03-13 PROCEDURE — 71000039 HC RECOVERY, EACH ADD'L HOUR: Performed by: ORTHOPAEDIC SURGERY

## 2017-03-13 PROCEDURE — 0JBK0ZZ EXCISION OF LEFT HAND SUBCUTANEOUS TISSUE AND FASCIA, OPEN APPROACH: ICD-10-PCS | Performed by: ORTHOPAEDIC SURGERY

## 2017-03-13 PROCEDURE — 87075 CULTR BACTERIA EXCEPT BLOOD: CPT

## 2017-03-13 PROCEDURE — 92610 EVALUATE SWALLOWING FUNCTION: CPT

## 2017-03-13 PROCEDURE — 25000003 PHARM REV CODE 250: Performed by: INTERNAL MEDICINE

## 2017-03-13 PROCEDURE — 36415 COLL VENOUS BLD VENIPUNCTURE: CPT

## 2017-03-13 PROCEDURE — 11000001 HC ACUTE MED/SURG PRIVATE ROOM

## 2017-03-13 PROCEDURE — 25000003 PHARM REV CODE 250: Performed by: ANESTHESIOLOGY

## 2017-03-13 PROCEDURE — 80053 COMPREHEN METABOLIC PANEL: CPT

## 2017-03-13 PROCEDURE — 71000033 HC RECOVERY, INTIAL HOUR: Performed by: ORTHOPAEDIC SURGERY

## 2017-03-13 RX ORDER — ROCURONIUM BROMIDE 10 MG/ML
INJECTION, SOLUTION INTRAVENOUS
Status: DISCONTINUED | OUTPATIENT
Start: 2017-03-13 | End: 2017-03-13

## 2017-03-13 RX ORDER — SUCCINYLCHOLINE CHLORIDE 20 MG/ML
INJECTION INTRAMUSCULAR; INTRAVENOUS
Status: DISCONTINUED | OUTPATIENT
Start: 2017-03-13 | End: 2017-03-13

## 2017-03-13 RX ORDER — SODIUM CHLORIDE 0.9 % (FLUSH) 0.9 %
3 SYRINGE (ML) INJECTION
Status: DISCONTINUED | OUTPATIENT
Start: 2017-03-13 | End: 2017-03-15 | Stop reason: HOSPADM

## 2017-03-13 RX ORDER — PROPOFOL 10 MG/ML
VIAL (ML) INTRAVENOUS
Status: DISCONTINUED | OUTPATIENT
Start: 2017-03-13 | End: 2017-03-13

## 2017-03-13 RX ORDER — SODIUM CHLORIDE 9 MG/ML
INJECTION, SOLUTION INTRAVENOUS CONTINUOUS
Status: ACTIVE | OUTPATIENT
Start: 2017-03-13 | End: 2017-03-14

## 2017-03-13 RX ORDER — FENTANYL CITRATE 50 UG/ML
INJECTION, SOLUTION INTRAMUSCULAR; INTRAVENOUS
Status: DISCONTINUED | OUTPATIENT
Start: 2017-03-13 | End: 2017-03-13

## 2017-03-13 RX ORDER — LIDOCAINE HCL/PF 100 MG/5ML
SYRINGE (ML) INTRAVENOUS
Status: DISCONTINUED | OUTPATIENT
Start: 2017-03-13 | End: 2017-03-13

## 2017-03-13 RX ORDER — HYDROMORPHONE HYDROCHLORIDE 2 MG/ML
0.5 INJECTION, SOLUTION INTRAMUSCULAR; INTRAVENOUS; SUBCUTANEOUS EVERY 5 MIN PRN
Status: DISCONTINUED | OUTPATIENT
Start: 2017-03-13 | End: 2017-03-14

## 2017-03-13 RX ORDER — INSULIN ASPART 100 [IU]/ML
0-5 INJECTION, SOLUTION INTRAVENOUS; SUBCUTANEOUS
Status: DISCONTINUED | OUTPATIENT
Start: 2017-03-13 | End: 2017-03-15 | Stop reason: HOSPADM

## 2017-03-13 RX ORDER — SODIUM CHLORIDE 0.9 % (FLUSH) 0.9 %
3 SYRINGE (ML) INJECTION EVERY 8 HOURS
Status: DISCONTINUED | OUTPATIENT
Start: 2017-03-13 | End: 2017-03-15 | Stop reason: HOSPADM

## 2017-03-13 RX ORDER — BACITRACIN 50000 [IU]/1
INJECTION, POWDER, FOR SOLUTION INTRAMUSCULAR
Status: DISCONTINUED | OUTPATIENT
Start: 2017-03-13 | End: 2017-03-13 | Stop reason: HOSPADM

## 2017-03-13 RX ORDER — ONDANSETRON 2 MG/ML
INJECTION INTRAMUSCULAR; INTRAVENOUS
Status: DISCONTINUED | OUTPATIENT
Start: 2017-03-13 | End: 2017-03-13

## 2017-03-13 RX ORDER — SODIUM CHLORIDE 9 MG/ML
INJECTION, SOLUTION INTRAVENOUS CONTINUOUS
Status: DISCONTINUED | OUTPATIENT
Start: 2017-03-13 | End: 2017-03-15 | Stop reason: HOSPADM

## 2017-03-13 RX ORDER — SODIUM CHLORIDE 9 MG/ML
INJECTION, SOLUTION INTRAVENOUS CONTINUOUS PRN
Status: DISCONTINUED | OUTPATIENT
Start: 2017-03-13 | End: 2017-03-13

## 2017-03-13 RX ORDER — ONDANSETRON 2 MG/ML
4 INJECTION INTRAMUSCULAR; INTRAVENOUS ONCE AS NEEDED
Status: ACTIVE | OUTPATIENT
Start: 2017-03-13 | End: 2017-03-13

## 2017-03-13 RX ADMIN — ONDANSETRON 4 MG: 2 INJECTION, SOLUTION INTRAMUSCULAR; INTRAVENOUS at 06:03

## 2017-03-13 RX ADMIN — LEVETIRACETAM 500 MG: 500 TABLET ORAL at 08:03

## 2017-03-13 RX ADMIN — VANCOMYCIN HYDROCHLORIDE 750 MG: 750 INJECTION, POWDER, LYOPHILIZED, FOR SOLUTION INTRAVENOUS at 07:03

## 2017-03-13 RX ADMIN — METOPROLOL TARTRATE 25 MG: 25 TABLET ORAL at 09:03

## 2017-03-13 RX ADMIN — HEPARIN SODIUM 5000 UNITS: 5000 INJECTION, SOLUTION INTRAVENOUS; SUBCUTANEOUS at 09:03

## 2017-03-13 RX ADMIN — SODIUM CHLORIDE, PRESERVATIVE FREE 3 ML: 5 INJECTION INTRAVENOUS at 09:03

## 2017-03-13 RX ADMIN — PIPERACILLIN SODIUM AND TAZOBACTAM SODIUM 4.5 G: 4; .5 INJECTION, POWDER, FOR SOLUTION INTRAVENOUS at 12:03

## 2017-03-13 RX ADMIN — PIPERACILLIN SODIUM AND TAZOBACTAM SODIUM 4.5 G: 4; .5 INJECTION, POWDER, FOR SOLUTION INTRAVENOUS at 03:03

## 2017-03-13 RX ADMIN — FENTANYL CITRATE 25 MCG: 50 INJECTION, SOLUTION INTRAMUSCULAR; INTRAVENOUS at 05:03

## 2017-03-13 RX ADMIN — SODIUM CHLORIDE: 0.9 INJECTION, SOLUTION INTRAVENOUS at 11:03

## 2017-03-13 RX ADMIN — VANCOMYCIN HYDROCHLORIDE 750 MG: 750 INJECTION, POWDER, LYOPHILIZED, FOR SOLUTION INTRAVENOUS at 04:03

## 2017-03-13 RX ADMIN — ROCURONIUM BROMIDE 5 MG: 10 INJECTION, SOLUTION INTRAVENOUS at 05:03

## 2017-03-13 RX ADMIN — LEVETIRACETAM 500 MG: 500 TABLET ORAL at 09:03

## 2017-03-13 RX ADMIN — SODIUM CHLORIDE: 0.9 INJECTION, SOLUTION INTRAVENOUS at 07:03

## 2017-03-13 RX ADMIN — SUCCINYLCHOLINE CHLORIDE 80 MG: 20 INJECTION, SOLUTION INTRAMUSCULAR; INTRAVENOUS at 05:03

## 2017-03-13 RX ADMIN — FERROUS SULFATE TAB EC 325 MG (65 MG FE EQUIVALENT) 325 MG: 325 (65 FE) TABLET DELAYED RESPONSE at 09:03

## 2017-03-13 RX ADMIN — FENTANYL CITRATE 100 MCG: 50 INJECTION, SOLUTION INTRAMUSCULAR; INTRAVENOUS at 05:03

## 2017-03-13 RX ADMIN — SODIUM CHLORIDE: 0.9 INJECTION, SOLUTION INTRAVENOUS at 05:03

## 2017-03-13 RX ADMIN — PROPOFOL 80 MG: 10 INJECTION, EMULSION INTRAVENOUS at 05:03

## 2017-03-13 RX ADMIN — DOCUSATE SODIUM 100 MG: 100 CAPSULE, LIQUID FILLED ORAL at 09:03

## 2017-03-13 RX ADMIN — PIPERACILLIN SODIUM AND TAZOBACTAM SODIUM 4.5 G: 4; .5 INJECTION, POWDER, FOR SOLUTION INTRAVENOUS at 11:03

## 2017-03-13 RX ADMIN — OXYCODONE AND ACETAMINOPHEN 1 TABLET: 5; 325 TABLET ORAL at 08:03

## 2017-03-13 RX ADMIN — PIPERACILLIN SODIUM AND TAZOBACTAM SODIUM 4.5 G: 4; .5 INJECTION, POWDER, FOR SOLUTION INTRAVENOUS at 08:03

## 2017-03-13 RX ADMIN — HYDROMORPHONE HYDROCHLORIDE 0.5 MG: 2 INJECTION, SOLUTION INTRAMUSCULAR; INTRAVENOUS; SUBCUTANEOUS at 06:03

## 2017-03-13 RX ADMIN — AMLODIPINE BESYLATE 5 MG: 5 TABLET ORAL at 08:03

## 2017-03-13 RX ADMIN — METOPROLOL TARTRATE 25 MG: 25 TABLET ORAL at 08:03

## 2017-03-13 RX ADMIN — LIDOCAINE HYDROCHLORIDE 100 MG: 20 INJECTION, SOLUTION INTRAVENOUS at 05:03

## 2017-03-13 NOTE — PROGRESS NOTES
Consult dictated  Agree with diagnosis of infected hematoma  Will plan surgical I and D hand later today

## 2017-03-13 NOTE — PLAN OF CARE
Problem: Patient Care Overview  Goal: Plan of Care Review  Outcome: Ongoing (interventions implemented as appropriate)  Assist with turning and repositioning Q 2 hr. C/o slight back pain. No c/o pain to Lt hand. Dressing remain dry/intact.NPO after MN as ordered. Will continue to monitor

## 2017-03-13 NOTE — PLAN OF CARE
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 The patient presented to [facility] on 3/11/2017 with a primary complaint of Hand Injury (Spouse accidentally scratched left hand with fingernail on Wednesday creating a skin tear to top of hand. Now swollen with hematoma. )    Pt staying in [place] with family until he can care for himself. [place] address listed under confidential address.    Should HH be ordered, pt's family requests [facility]  in [place], [phone]       03/12/17 2001   Discharge Assessment   Assessment Type Discharge Planning Assessment   Confirmed/corrected address and phone number on facesheet? Yes   Assessment information obtained from? Caregiver   Expected Length of Stay (days) 2   Communicated expected length of stay with patient/caregiver yes   Prior to hospitilization cognitive status: Alert/Oriented   Prior to hospitalization functional status: Partially Dependent;Needs Assistance   Current cognitive status: Alert/Oriented  (very hard of hearing)   Current Functional Status: Needs Assistance;Partially Dependent   Arrived From home or self-care   Lives With other relative(s)  (has residence in [place], staying in [facility] with friends to care for him)   Able to Return to Prior Arrangements yes   Is patient able to care for self after discharge? No   How many people do you have in your home that can help with your care after discharge? 2   Who are your caregiver(s) and their phone number(s)? Bryanna Lal [phone], [phone]   Patient's perception of discharge disposition home health   Readmission Within The Last 30 Days no previous admission in last 30 days   Patient  currently being followed by outpatient case management? No   Patient currently receives home health services? No   Does the patient currently use HME? Yes   Equipment Currently Used at Home rollator;bedside commode;grab bar   Do you have any problems affording any of your prescribed medications? No   Is the patient taking medications as prescribed? yes   Do you have any financial concerns preventing you from receiving the healthcare you need? No   Does the patient have transportation to healthcare appointments? Yes   Transportation Available family or friend will provide   On Dialysis? No   Does the patient receive services at the Coumadin Clinic? No   Are there any open cases? No   Discharge Plan A Home with family   Discharge Plan B Home Health   Patient/Family In Agreement With Plan yes     Cassandra Pierre RN Transitional Navigator  [phone]

## 2017-03-13 NOTE — PLAN OF CARE
Problem: SLP Goal  Goal: SLP Goal  Outcome: Ongoing (interventions implemented as appropriate)  3/13/17: Swallow study completed this am and pt presents with dysphagia history to thin liquids. Pt with throat clearing and coughing on nectar, pudding and soft solid textures. REC: NPO for now with MBS in am to objectively r/o aspiration.   TALYA Proctor, [facility]-SLP  Speech Pathologist 3/13/2017

## 2017-03-13 NOTE — ANESTHESIA PREPROCEDURE EVALUATION
03/13/2017  Darius Paiz Jr. is a 86 y.o., male here w/ left hand cellulitis scheduled for I&D under regional/MAC.     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    I have reviewed the Nursing Notes.   I have reviewed the Medications.     Review of Systems  Anesthesia Hx:  Denies Hx of Anesthetic complications    Social:  Non-Smoker    Hematology/Oncology:     Oncology Normal    -- Anemia:   EENT/Dental:EENT/Dental Normal   Cardiovascular:   Hypertension Valvular problems/Murmurs (Mild Aortic Regurg seen on echo from 2014), AI    Pulmonary:  Pulmonary Normal    Renal/:   Chronic Renal Disease, ARF BPH    Hepatic/GI:   Liver Disease,    Neurological:   CVA, residual symptoms Seizures +dysphagia residual from CVA    H/o post-traumatic subdural hematoma   Endocrine:   Diabetes    Dermatological:   Left hand cellulitis   Psych:  Psychiatric Normal           Physical Exam  General:  Well nourished    Airway/Jaw/Neck:  Airway Findings: Mouth Opening: Small, but > 3cm Tongue: Normal  General Airway Assessment: Adult  Mallampati: II  Improves to II with phonation.  TM Distance: Normal, at least 6 cm         Eyes/Ears/Nose:  EYES/EARS/NOSE FINDINGS: Normal   Dental:  Dental Findings: Upper Dentures, Lower Dentures   Chest/Lungs:  Chest/Lungs Clear    Heart/Vascular:  Heart Findings: Normal Heart murmur: negative    Abdomen:  Abdomen Findings: Normal     Skin:  Skin Findings:  Left hand cellulitis       Mental Status:  Mental Status Findings: Normal      Lab Results   Component Value Date    WBC 9.08 03/13/2017    HGB 9.8 (L) 03/13/2017    HCT 30.0 (L) 03/13/2017     03/13/2017    CHOL 134 03/12/2017    TRIG 61 03/12/2017    HDL 50 03/12/2017    ALT 9 (L) 03/13/2017    AST 11 03/13/2017     03/13/2017    K 4.2 03/13/2017     03/13/2017    CREATININE 1.8 (H) 03/13/2017    BUN 22 03/13/2017    CO2 21 (L) 03/13/2017    TSH 2.819 03/12/2017    INR 1.0 04/17/2014    HGBA1C 6.7 (H) 03/12/2017     EKG 3/12/17  Sinus rhythm with 1st degree A-V block  Otherwise normal ECG    2D Echo 4/17/2014  CONCLUSIONS     1 - Hyperdynamic left ventricular systolic function (EF >65%).     2 - Concentric remodeling.     3 - Trivial mitral regurgitation.     4 - Mild aortic regurgitation.     5 - Technically difficult and limited study.       Anesthesia Plan  Type of Anesthesia, risks & benefits discussed:  Anesthesia Type:  regional, MAC, general  Patient's Preference:   Intra-op Monitoring Plan:   Intra-op Monitoring Plan Comments:   Post Op Pain Control Plan:   Post Op Pain Control Plan Comments:   Induction:   IV  Beta Blocker:  Patient is on a Beta-Blocker and has received one dose within the past 24 hours (No further documentation required).       Informed Consent: Patient understands risks and agrees with Anesthesia plan.  Questions answered. Anesthesia consent signed with patient.  ASA Score: 3     Day of Surgery Review of History & Physical: I have interviewed and examined the patient. I have reviewed the patient's H&P dated:  There are no significant changes.          Ready For Surgery From Anesthesia Perspective.

## 2017-03-13 NOTE — PLAN OF CARE
Patient in bed with wife at bedside. Patient was at [facility] but has now been living at home with wife for 1 week. Patient's wife states their niece takes care of him during the day and she (wife) takes care of him at night as well. Patient has a rollator, BSC, and raised toilet. Patient scheduled for surgery today, will continue to follow.        03/13/17 1230   Discharge Reassessment   Assessment Type Discharge Planning Reassessment   Describe the patient's ability to walk at the present time. Walks with the help of equipment   How often would a person be available to care for the patient? Often   Discharge Plan A Home with family;Home Health   Discharge Plan B Home with family;Home Health   Involved the patient/caregiver in establishing a new discharge plan: Yes       Obdulia Mora RN  Transition Navigator  [phone]

## 2017-03-13 NOTE — TRANSFER OF CARE
"Anesthesia Transfer of Care Note    Patient: Darius Paiz Jr.    Procedure(s) Performed: Procedure(s) (LRB):  INCISION AND DRAINAGE (I&D), HAND (Left)    Patient location: PACU    Anesthesia Type: general    Transport from OR: Transported from OR on 6-10 L/min O2 by face mask with adequate spontaneous ventilation    Post pain: adequate analgesia    Post assessment: no apparent anesthetic complications    Post vital signs: stable    Level of consciousness: awake    Nausea/Vomiting: no nausea/vomiting    Complications: none          Last vitals:   Visit Vitals    BP (!) 169/72    Pulse 82    Temp 36.5 °C (97.7 °F) (Oral)    Resp 15    Ht 5' 3" (1.6 m)    Wt 57.2 kg (126 lb)    SpO2 100%    BMI 22.32 kg/m2     "

## 2017-03-13 NOTE — PHYSICIAN QUERY
PT Name: Darius Paiz Jr.  MR #: [id number]  Physician Query Form -Present on Admission (POA) Diagnosis Clarification   Reviewer Yamilet Brewster RN CDIS  Ext [email or web address]     This form is a permanent document in the medical record.     Query Date: March 13, 2017    By submitting this query, we are merely seeking further clarification of documentation to reflect the severity of illness of your patient. Please utilize your independent clinical judgment when addressing the question(s) below.        The Medical record reflects the following:  Diagnosis    Supporting Clinical Information Location in Medical Record   [facility]            CR= 1.3 - 1.8  BUN= 24 - 21  GRF= 49 - 33    NS @ 75 mL/h continuous IVF PN Attestation 03/13    Labs 03/11 - 03/13        MAR, started 03/13     Doctor, Please specify POA status of __AKI_______.    [  ] Yes  [ X ] No  [  ] Clinically undetermined

## 2017-03-13 NOTE — PT/OT/SLP EVAL
Speech Language Pathology  Swallow Evaluation    Darius Paiz Jr.   MRN: [id number]   Admitting Diagnosis: Traumatic hematoma of hand with infection    Diet recommendations: Solid Diet Level: NPO  Liquid Diet Level: NPO  SLP Treatment Date: 03/13/17  Speech Start Time: 1030     Speech Stop Time: 1111     Speech Total (min): 41 min       TREATMENT BILLABLE MINUTES:  Eval Swallow and Oral Function 26 and [facility] Care/Home Management Training 15       Diagnosis: Traumatic hematoma of hand with infection  Darius Paiz Jr. is a 86 y.o.  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 The patient presented to [facility] on 3/11/2017 with a primary complaint of Hand Injury (Spouse accidentally scratched left hand with fingernail on Wednesday creating a skin tear to top of hand. Now swollen with hematoma. )     Mr. Paiz is an 85 y/o M with a PMH significant for DM II, CVA w/ dysphagia, prior subdural hematoma, BPH, Macrocytic Anemia 2/2 to B12 Deficiency, Seizure Disorder, and HTN who presents to the [facility] ED with a chief complaint of L hand injury. Patient was a poor historian and history was obtained from friend, Bryanna Lal, at bedside. Patient was in his USOH until the morning of 3/08/2017 when he was being helped up from his bed and his friend accidentally scratched the dorsal surface of his L hand with her finger nail. Shortly after, Ms. Lal noticed L hand swelling in Mr. Valentine. She states at that time she applied hydrogen peroxide and alcohol to the wound however Mr. Valentine's L hand continued to swell over the next few days. On Friday, Ms. Lal states she began to notice  "skin color changes in Mr. Valentine's L hand and wrist. Upon examination, Mr. Valentine denies numbness or tingling, fevers or chills, nausea, vomiting, chest pain, or SOB.      Of note, patient was recently discharged in 2017 from [facility] after experiencing a fall. He successfully completed rehab at [facility] and recently returned home. At baseline, he requires assistance with all his ADL's and walks with a walker w/ assistance.     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 : Yes  Keep patient NPO?: Yes   General Precautions: Standard, aspiration, fall    Current Respiratory Status:  (room air)     Social Hx: Patient lives with caregivers at home. Per caregiver, pt has one caregiver during the day and another caregiver at night. Caregiver reports they help pt with all ADLs.    Prior diet: Caregiver reports pt has a regular/thin liquids diet, with the exception of water. Pt's water is the only thin liquids that us thickened to nectar thick consistency, per caregiver.     Subjective:  Pt found in bed upon SLP entry. Both caregivers present in room during time of BSE. Pt was awake and able to orient to person and . However, pt observed to be [place] throughout eval and repeatedly asked, "What are yall doing? What's going on?" Dr. Cook and team entered room during eval and discussed pt's progress and current medical status with pt and caregivers. Dr. Cook " "explained concerns for pt's swallowing skills, regarding nutritional intake, and necessity for BSE.    Patient goals: "I don't think I swallowed that. I think its stuck," per pt when trialing dental soft (diced peaches) textures.    Pain Ratin/10       Objective:   Patient found with: bed alarm, blood pressure cuff  SLP consulted for BSE. SLP confirmed with RN prior to entry. Pt participated in BSE with po trials of thin liquids, nectar thick liquids, puree, and dental soft textures. Pt demonstrated wet, gurgly voice s/p swallow of thin liquids and was observed to throat clear multiple times. SLP instructed pt to perform multiple swallows to adequately clear swallowed thin liquids. Pt unable to clear thin liquids adequately with multiple swallows. Pt trialed with nectar thick liquids trials, with multiple swallows technique instructed by SLP. Pt demonstrated wet, gurgly voice s/p swallow of nectar thick liquids. Pt adequately tolerated puree textures (pudding), with multiple swallows instructed by SLP. Pt trialed with dental soft textures (diced peaches) and demonstrated immediate choking/coughing upon initiation of swallow. Pt stated "I think its stuck." SLP instructed pt to perform productive cough and multiple swallows. Pt with delayed ability to adequately clear dental soft textures with multiple swallows and productive cough. SLP unable to determine safest and least restrictive diet at this time. SLP recs: NPO until pt participates in MBS. Pt would benefit from MBS to r/o penetration/aspiration and to determine safest and least restrictive diet.     Oral Musculature Evaluation  Oral Musculature: general weakness (left facial drool noted)  Dentition: upper and lower dentures  Secretion Management: problems swallowing secretions  Mucosal Quality: dry, sticky  Mandibular Strength and Mobility: impaired  Oral Labial Strength and Mobility:  (decreased strength)  Lingual Strength and Mobility: impaired strength, " impaired left lateral movement  Velar Elevation: WFL  Buccal Strength and Mobility: decreased tone  Volitional Cough:  (elicited)  Volitional Swallow:  (untimely upon palpitation, pt demonstrated uncoordination of laryngeal movements)  Voice Prior to PO Intake:  (garbled, slow speech noted )     Bedside Swallow Eval:  Consistencies Assessed: Thin liquids by tsp 2x, Nectar thick liquids by tsp 2x and regulated cup sips 2x, Puree (pudding) 3x and Soft solids (diced peaches) 1x  Oral Phase: Pt demonstrated dry mouth, mouth breathing, impaired rotational chew, pocketing of secretions and po textures of pudding, and slow oral transit time.  Pharyngeal Phase:Pt demonstrated coughing/choking on dental soft textures, decreased and untimely hyolaryngeal excursion, throat clearing and wet vocal quality after swallow of thin and nectar thick liquids. Pt to participate in MBS to r/o penetration/aspiration and to determine safest oral diet.    Additional Treatment:  SLP educated pt and caregivers on role of SLP and BSE. SLP also provided skilled education to caregivers on how to appropriately thicken liquids and importance of thickened liquids. Pt and caregivers acknowledged and verbalized understanding.    FIM:              Comprehension: 3 Moderate Prompting--The patient understands directions and conversation about basid daily needs 50 to 74% of the time.   Expression: 3 Moderate Prompting--The patient expresses basic daily needs and ideas 50 to 74% of the time.          Assessment:  Darius Paiz Jr. is a 86 y.o. male with a medical diagnosis of Traumatic hematoma of hand with infection and presents with moderate-severe dysphagia. Pt demonstrates impaired oral and pharyngeal phases of the swallow, as mentioned above. SLP unable to determine safest and least restrictive diet at this time. SLP recs: NPO until pt participates in MBS. Pt would benefit from MBS to r/o penetration/aspiration and to determine safest and least  restrictive diet.    Do you have any cultural, spiritual, Yazidism conflicts, given your current situation?:  (none)     Discharge recommendations: Discharge Facility/Level Of Care Needs:  (TBD)     Goals:   SLP Goals        Problem: SLP Goal    Goal Priority Disciplines Outcome   SLP Goal     SLP Ongoing (interventions implemented as appropriate)              Plan:   Patient to be seen Therapy Frequency: 3 x/week   Plan of Care expires: 04/11/17  Plan of Care reviewed with: patient, caregiver (MD and RN)  SLP Follow-up?: Yes  SLP - Next Visit Date: 03/14/17           THONG Josue  SLP- Clinician   3/13/2017      I certify that I was present in the room directing the student in service delivery and guiding them using my skilled judgment. As the co-signing therapist I have reviewed the students documentation and am responsible for the treatment, assessment, and plan.     TALYA Proctor, [facility]SLP  Speech Pathologist 3/13/2017

## 2017-03-13 NOTE — PROGRESS NOTES
.Pharmacy New Medication Education    Patient accepted medication education.    Pharmacy educated patient on the following medications, using the teach-back method.   Norvasc  Asa  Lipitor  Vit b12  Colace  Ferrous sulfate  Heparin  Keppra  Lopressor  Percocet  Zosyn  Flomax  vancomycin    Learners of pharmacy medication education included:  patient    Patient +/- learner response:  Verbalize understanding

## 2017-03-13 NOTE — TELEPHONE ENCOUNTER
----- Message from Stephanie Liang sent at 3/13/2017  7:52 AM CDT -----  Contact: [phone] / Claudette w/ [facility]  CONSULTS:     Patient: Darius Paiz    : 1930    Clinic#:  [id number]    Room number: 481    Referring MD: Dr. Zhou    Diagnosis: Left hand redness and swelling. Traumatic hematoma of left hand with infection    Person [phone] / Claudette w/ [facility]

## 2017-03-13 NOTE — PROGRESS NOTES
U Internal Medicine Resident HO-III Progress Note    Subjective:      Darius Paiz Jr. is a 86 y.o.  male who is being followed by the U Internal Medicine service at [facility] for left hand cellulitis.     Patient states he slept well overnight.  Continues to complain of some back/hip pain but minimal pain in his hand.  Denies any shortness of breath, chest pain, abdominal pain.  Significant other reports some improvement in the redness and swelling since starting antibiotics.       Objective:   Last 24 Hour Vital Signs:  BP  Min: 114/58  Max: 154/72  Temp  Av.6 °F (36.4 °C)  Min: 97.4 °F (36.3 °C)  Max: 97.8 °F (36.6 °C)  Pulse  Av.7  Min: 67  Max: 95  Resp  Av.2  Min: 18  Max: 20  SpO2  Av.2 %  Min: 97 %  Max: 100 %  Weight  Av.2 kg (126 lb)  Min: 57.2 kg (126 lb)  Max: 57.2 kg (126 lb)  I/O last 3 completed shifts:  In: 1095 [P.O.:545; IV Piggyback:550]  Out: 1050 [Urine:1050]    Physical Examination:  General:  Lying in bed, resting comfortably  HEENT:  EOMI, PERRL, MMM   CV:  RRR, normal S1/S2, no audible murmur  Resp:  CTAB, no crackles or wheezes appreciated  Abd:  Soft, NT/ND, normoactive bowel sounds  Ext:  Abrasion to dorsal surface of left hand with surrounding erythema and  Cellulitis, also concerning for hematoma,erythematous streaking noted up forearm, scattered ecchymosis to bilateral upper extremities, some improvement in edema since prior exam Pulses:  2+ radial pulses bilaterally  Neuro: sensation intact throughout, range of motion of left wrist/fingers limited secondary to pain and edema yet does have function of all joints     Laboratory:  Laboratory Data Reviewed: yes  Pertinent Findings:  All AM labs pending    Microbiology Data Reviewed: yes  Pertinent Findings:  Microbiology Results (last 7 days)     Procedure Component Value Units Date/Time    Blood Culture #2 **CANNOT BE ORDERED STAT** [[id number]] Collected:  17 5426    Order Status:   Completed Specimen:  Blood from Peripheral, Forearm, Right Updated:  03/12/17 1345     Blood Culture, Routine No Growth to date    Blood Culture #1 **CANNOT BE ORDERED STAT** [[id number]] Collected:  03/11/17 2310    Order Status:  Completed Specimen:  Blood from Peripheral, Hand, Right Updated:  03/12/17 1315     Blood Culture, Routine No Growth to date          Other Results:  Radiology Data Reviewed: yes  Pertinent Findings:  X-ray left hand:  No evidence of a fracture or dislocation.  Diffuse soft tissue swelling involving the left hand.    Current Medications:     Infusions:        Scheduled:   amlodipine  5 mg Oral Daily    aspirin  81 mg Oral Daily    atorvastatin  40 mg Oral Daily    cyanocobalamin  1,000 mcg Oral Daily    docusate sodium  100 mg Oral BID    ferrous sulfate  325 mg Oral BID    heparin (porcine)  5,000 Units Subcutaneous Q12H    levetiracetam  500 mg Oral BID    metoprolol tartrate  25 mg Oral BID    piperacillin-tazobactam 4.5 g in dextrose 5 % 100 mL IVPB (ready to mix system)  4.5 g Intravenous Q8H    tamsulosin  0.4 mg Oral Daily    vancomycin (VANCOCIN) IVPB  15 mg/kg Intravenous Q24H        PRN:  acetaminophen, dextrose 50%, dextrose 50%, glucagon (human recombinant), glucose, glucose, oxycodone-acetaminophen    Antibiotics and Day Number of Therapy:  Vancomycin (started 3/12)  Zosyn (started 3/11)    Lines and Day Number of Therapy:  PIV    Assessment:     Darius Paiz  is a 86 y.o.male with  Patient Active Problem List    Diagnosis Date Noted    History of CVA (cerebrovascular accident) 03/12/2017    Essential hypertension 03/12/2017    Traumatic hematoma of hand with infection 03/12/2017    Cellulitis of hand, left 03/12/2017    Hand pain, left 03/12/2017    B12 deficiency 01/20/2017    Physical deconditioning     Fall 01/19/2017    Elevated serum creatinine 01/19/2017    Multiple falls 01/19/2017    CVA (cerebral infarction) 04/17/2014    Gait disorder  10/07/2013    BPH (benign prostatic hyperplasia) 06/17/2013    Seizure 08/20/2012        Plan:     Left Hand Cellulitis and Concern for Infected Hematoma:  -Per patient hx - laceration to dorsum of L hand via fingernail on 03/08/2017/  -Progressive swelling noticed over last 4 days; erythema/skin changes noticed 03/10/2017.  -Exam revealing hematoma/laceration noted on dorsum of L hand.   -Sensation, motor function, and pulses remain intact.  -Afebrile. WBC WNL. Sed Rate 118. CRP 37. Lactate 1.3.   -Blood Cultures NGTD.   -Left hand x-ray with no fractures yet diffuse soft tissues swelling.  -Continue Vanc and Zosyn while cultures pending, improvement noted in exam yet continues to have diffuse erythema now streaking up his arm.  -Pending Ortho consult.  Will discuss need for further imaging and possible debridement of the area.     HTN  -BP upon admission 137/54.  -Continue home amlodipine 5mg qD and Lopressor 25mg BID.  -Will continue to monitor.     Seizure Disorder  -No acute issues.  -Continue home keppra 500mg BID.     Anemia 2/2 B12 Deficiency  -H/H 10.6/32.0 MCV 98 on arrival, AM labs pending.  -B12 1/2017 <146. Folate 7.7  -colonoscopy is not up to date   -Continuing home cyanocobalamin 1000mcg qD.   -Continuing home ferrous sulfate 325mg BID and colace 100mg BID.      Hx of CVA w/ Dysphagia  -No acute issues/no new focal deficits.  -Continuing home atorvastatin 40mg qD and ASA 81mg qD.   -Continue thickening liquids per home routine.     BPH  -Continue home flomax 0.4mg qD.      DM Type II  -Previous A1c 6.3% in 2014.  Patient is not currently on any home medications.  -Repeat A1c pending.   -Will order SSI and accuchecks.    History of Deconditioning/Frequent Falls:  -Recently [facility] from [facility] after 6 weeks of therapy.  -Will consult PT/OT prior to discharge to re-evaluate.     Back Pain:  -Noted to have large, erythematous, pedunculated growth to mid back.  -Consulted wound care for further  assessment.      HM: TSH 2.819, A1c pending.      Diet: NPO for Ortho evaluation   DVT PPx: Heparin  Dispo: Pending improvement in L hand erythema/edema and further recommendations from ortho.     Kasie Gandhi  [facility] Internal Medicine HO-III  [facility] Internal Medicine Service Team A    [facility] Medicine Hospitalist Pager numbers:   [facility] Hospitalist Medicine Team A (Joey/Jo): [phone]  [facility] Hospitalist Medicine Team B (Serenity/Jordan):  [phone]

## 2017-03-13 NOTE — CONSULTS
"Consulted for mid back pedunculated growth. Family asking if I came to "cut it off". Told them that I would ask primary team for Dermatologist consult in order to manage growth effectively. No bleeding or oozing of any kind from skin growth. Left open to air. No pain reported to area. Pain reported to buttocks. When patient turned I found blanchable redness from incontinence. Brought zinc oxide barrier cream in room to apply. Left hand already seen per MD Vidal who will take patient for surgical I&D this evening for infected hematoma. Left hand acutely painful to touch with discoloration that extends into fingers. Noted patient's heels intact with blanchable redness. Pt bed bound. Applied z-flex heel boots to protect since difficult to keep heels floated from pillow and patient was complaining of heel/ankle pain prior to using pillow to float.     Mid Back with growth    Buttocks with blanchable redness from incontinence related issues.     Left hand with hematoma and planned intervention. Skin tear to dorsal hand with dry eschar.         "

## 2017-03-13 NOTE — BRIEF OP NOTE
[facility]  Brief Operative Note    SUMMARY     Surgery Date: 3/13/2017     Surgeon(s) and Role:     * Juan Manuel Vidal Jr., MD - Primary    Assisting Surgeon: None    Pre-op Diagnosis:  Traumatic hematoma of hand with infection, left, initial encounter [S60.222A, L08.9]    Post-op Diagnosis:  Post-Op Diagnosis Codes:     * Traumatic hematoma of hand with infection, left, initial encounter [S60.222A, L08.9]    Procedure(s) (LRB):  INCISION AND DRAINAGE (I&D), HAND (Left)    Anesthesia: General    Description of Procedure: I and D left hand    Description of the findings of the procedure: infected hematoma left hand    Estimated Blood Loss: * No values recorded between 3/13/2017  5:42 PM and 3/13/2017  6:14 PM *         Specimens:   Specimen     None

## 2017-03-14 PROBLEM — N40.0 BENIGN PROSTATIC HYPERPLASIA: Status: ACTIVE | Noted: 2017-03-14

## 2017-03-14 LAB
ALBUMIN SERPL BCP-MCNC: 2.6 G/DL
ALP SERPL-CCNC: 84 U/L
ALT SERPL W/O P-5'-P-CCNC: 9 U/L
ANION GAP SERPL CALC-SCNC: 7 MMOL/L
AST SERPL-CCNC: 14 U/L
BASOPHILS # BLD AUTO: 0.04 K/UL
BASOPHILS NFR BLD: 0.4 %
BILIRUB SERPL-MCNC: 1.1 MG/DL
BUN SERPL-MCNC: 17 MG/DL
CALCIUM SERPL-MCNC: 8.5 MG/DL
CHLORIDE SERPL-SCNC: 108 MMOL/L
CO2 SERPL-SCNC: 21 MMOL/L
CREAT SERPL-MCNC: 1.5 MG/DL
DIFFERENTIAL METHOD: ABNORMAL
EOSINOPHIL # BLD AUTO: 0.4 K/UL
EOSINOPHIL NFR BLD: 4 %
ERYTHROCYTE [DISTWIDTH] IN BLOOD BY AUTOMATED COUNT: 12.3 %
EST. GFR  (AFRICAN AMERICAN): 48 ML/MIN/1.73 M^2
EST. GFR  (NON AFRICAN AMERICAN): 42 ML/MIN/1.73 M^2
GLUCOSE SERPL-MCNC: 97 MG/DL
GRAM STN SPEC: NORMAL
GRAM STN SPEC: NORMAL
HCT VFR BLD AUTO: 30.8 %
HGB BLD-MCNC: 10.3 G/DL
LYMPHOCYTES # BLD AUTO: 1.5 K/UL
LYMPHOCYTES NFR BLD: 14.8 %
MCH RBC QN AUTO: 32.4 PG
MCHC RBC AUTO-ENTMCNC: 33.4 %
MCV RBC AUTO: 97 FL
MONOCYTES # BLD AUTO: 1.4 K/UL
MONOCYTES NFR BLD: 13.2 %
NEUTROPHILS # BLD AUTO: 6.9 K/UL
NEUTROPHILS NFR BLD: 67.4 %
PLATELET # BLD AUTO: 203 K/UL
PMV BLD AUTO: 9.2 FL
POCT GLUCOSE: 103 MG/DL (ref 70–110)
POCT GLUCOSE: 153 MG/DL (ref 70–110)
POCT GLUCOSE: 169 MG/DL (ref 70–110)
POCT GLUCOSE: 186 MG/DL (ref 70–110)
POTASSIUM SERPL-SCNC: 4.2 MMOL/L
PROT SERPL-MCNC: 6.8 G/DL
RBC # BLD AUTO: 3.18 M/UL
SODIUM SERPL-SCNC: 136 MMOL/L
VANCOMYCIN SERPL-MCNC: 16.3 UG/ML
WBC # BLD AUTO: 10.24 K/UL

## 2017-03-14 PROCEDURE — G8998 SWALLOW D/C STATUS: HCPCS | Mod: CI

## 2017-03-14 PROCEDURE — 63600175 PHARM REV CODE 636 W HCPCS: Performed by: STUDENT IN AN ORGANIZED HEALTH CARE EDUCATION/TRAINING PROGRAM

## 2017-03-14 PROCEDURE — 97535 SELF CARE MNGMENT TRAINING: CPT

## 2017-03-14 PROCEDURE — G8997 SWALLOW GOAL STATUS: HCPCS | Mod: CJ

## 2017-03-14 PROCEDURE — 25000003 PHARM REV CODE 250: Performed by: STUDENT IN AN ORGANIZED HEALTH CARE EDUCATION/TRAINING PROGRAM

## 2017-03-14 PROCEDURE — 85025 COMPLETE CBC W/AUTO DIFF WBC: CPT

## 2017-03-14 PROCEDURE — 25000003 PHARM REV CODE 250: Performed by: ANESTHESIOLOGY

## 2017-03-14 PROCEDURE — 94761 N-INVAS EAR/PLS OXIMETRY MLT: CPT

## 2017-03-14 PROCEDURE — 25000003 PHARM REV CODE 250: Performed by: INTERNAL MEDICINE

## 2017-03-14 PROCEDURE — 92611 MOTION FLUOROSCOPY/SWALLOW: CPT

## 2017-03-14 PROCEDURE — G8996 SWALLOW CURRENT STATUS: HCPCS | Mod: CI

## 2017-03-14 PROCEDURE — 80053 COMPREHEN METABOLIC PANEL: CPT

## 2017-03-14 PROCEDURE — 80202 ASSAY OF VANCOMYCIN: CPT

## 2017-03-14 PROCEDURE — 11000001 HC ACUTE MED/SURG PRIVATE ROOM

## 2017-03-14 RX ORDER — CLINDAMYCIN HYDROCHLORIDE 300 MG/1
300 CAPSULE ORAL 3 TIMES DAILY
Qty: 7 CAPSULE | Refills: 0 | Status: SHIPPED | OUTPATIENT
Start: 2017-03-14 | End: 2017-03-20 | Stop reason: SDUPTHER

## 2017-03-14 RX ORDER — ASPIRIN 81 MG/1
81 TABLET ORAL DAILY
Qty: 30 TABLET | Refills: 2 | Status: SHIPPED | OUTPATIENT
Start: 2017-03-14 | End: 2018-03-14

## 2017-03-14 RX ORDER — LEVETIRACETAM 500 MG/1
500 TABLET ORAL 2 TIMES DAILY
Qty: 60 TABLET | Refills: 2 | Status: SHIPPED | OUTPATIENT
Start: 2017-03-14 | End: 2017-09-21 | Stop reason: SDUPTHER

## 2017-03-14 RX ORDER — HYDROXYZINE HYDROCHLORIDE 25 MG/1
25 TABLET, FILM COATED ORAL 3 TIMES DAILY PRN
Qty: 30 TABLET | Refills: 0 | Status: SHIPPED | OUTPATIENT
Start: 2017-03-14 | End: 2019-01-17 | Stop reason: ALTCHOICE

## 2017-03-14 RX ORDER — AMLODIPINE BESYLATE 5 MG/1
5 TABLET ORAL DAILY
Qty: 90 TABLET | Refills: 3 | Status: SHIPPED | OUTPATIENT
Start: 2017-03-14 | End: 2018-03-14

## 2017-03-14 RX ORDER — METOPROLOL TARTRATE 25 MG/1
25 TABLET, FILM COATED ORAL 2 TIMES DAILY
Qty: 60 TABLET | Refills: 2 | Status: SHIPPED | OUTPATIENT
Start: 2017-03-14

## 2017-03-14 RX ORDER — FERROUS SULFATE 324(65)MG
325 TABLET, DELAYED RELEASE (ENTERIC COATED) ORAL 2 TIMES DAILY
Qty: 60 TABLET | Refills: 2 | Status: SHIPPED | OUTPATIENT
Start: 2017-03-14

## 2017-03-14 RX ORDER — DOCUSATE SODIUM 100 MG/1
100 CAPSULE, LIQUID FILLED ORAL 2 TIMES DAILY
Qty: 30 CAPSULE | Refills: 2 | Status: SHIPPED | OUTPATIENT
Start: 2017-03-14 | End: 2019-01-17

## 2017-03-14 RX ORDER — LANOLIN ALCOHOL/MO/W.PET/CERES
1000 CREAM (GRAM) TOPICAL DAILY
Qty: 30 TABLET | Refills: 2 | Status: SHIPPED | OUTPATIENT
Start: 2017-03-14

## 2017-03-14 RX ORDER — MORPHINE SULFATE 2 MG/ML
1 INJECTION, SOLUTION INTRAMUSCULAR; INTRAVENOUS EVERY 4 HOURS PRN
Status: DISCONTINUED | OUTPATIENT
Start: 2017-03-14 | End: 2017-03-15 | Stop reason: HOSPADM

## 2017-03-14 RX ORDER — TAMSULOSIN HYDROCHLORIDE 0.4 MG/1
0.4 CAPSULE ORAL DAILY
Qty: 30 CAPSULE | Refills: 2 | Status: SHIPPED | OUTPATIENT
Start: 2017-03-14

## 2017-03-14 RX ADMIN — OXYCODONE AND ACETAMINOPHEN 1 TABLET: 5; 325 TABLET ORAL at 06:03

## 2017-03-14 RX ADMIN — LEVETIRACETAM 500 MG: 500 TABLET ORAL at 09:03

## 2017-03-14 RX ADMIN — CYANOCOBALAMIN TAB 1000 MCG 1000 MCG: 1000 TAB at 08:03

## 2017-03-14 RX ADMIN — FERROUS SULFATE TAB EC 325 MG (65 MG FE EQUIVALENT) 325 MG: 325 (65 FE) TABLET DELAYED RESPONSE at 09:03

## 2017-03-14 RX ADMIN — TAMSULOSIN HYDROCHLORIDE 0.4 MG: 0.4 CAPSULE ORAL at 08:03

## 2017-03-14 RX ADMIN — METOPROLOL TARTRATE 25 MG: 25 TABLET ORAL at 08:03

## 2017-03-14 RX ADMIN — HEPARIN SODIUM 5000 UNITS: 5000 INJECTION, SOLUTION INTRAVENOUS; SUBCUTANEOUS at 09:03

## 2017-03-14 RX ADMIN — METOPROLOL TARTRATE 25 MG: 25 TABLET ORAL at 09:03

## 2017-03-14 RX ADMIN — MORPHINE SULFATE 1 MG: 2 INJECTION, SOLUTION INTRAMUSCULAR; INTRAVENOUS at 11:03

## 2017-03-14 RX ADMIN — ASPIRIN 81 MG: 81 TABLET, COATED ORAL at 08:03

## 2017-03-14 RX ADMIN — ATORVASTATIN CALCIUM 40 MG: 40 TABLET, FILM COATED ORAL at 08:03

## 2017-03-14 RX ADMIN — LEVETIRACETAM 500 MG: 500 TABLET ORAL at 08:03

## 2017-03-14 RX ADMIN — SODIUM CHLORIDE, PRESERVATIVE FREE 3 ML: 5 INJECTION INTRAVENOUS at 10:03

## 2017-03-14 RX ADMIN — DOCUSATE SODIUM 100 MG: 100 CAPSULE, LIQUID FILLED ORAL at 08:03

## 2017-03-14 RX ADMIN — FERROUS SULFATE TAB EC 325 MG (65 MG FE EQUIVALENT) 325 MG: 325 (65 FE) TABLET DELAYED RESPONSE at 08:03

## 2017-03-14 RX ADMIN — HEPARIN SODIUM 5000 UNITS: 5000 INJECTION, SOLUTION INTRAVENOUS; SUBCUTANEOUS at 08:03

## 2017-03-14 RX ADMIN — MORPHINE SULFATE 1 MG: 2 INJECTION, SOLUTION INTRAMUSCULAR; INTRAVENOUS at 02:03

## 2017-03-14 RX ADMIN — OXYCODONE AND ACETAMINOPHEN 1 TABLET: 5; 325 TABLET ORAL at 09:03

## 2017-03-14 RX ADMIN — OXYCODONE AND ACETAMINOPHEN 1 TABLET: 5; 325 TABLET ORAL at 02:03

## 2017-03-14 RX ADMIN — SODIUM CHLORIDE, PRESERVATIVE FREE 3 ML: 5 INJECTION INTRAVENOUS at 06:03

## 2017-03-14 RX ADMIN — PIPERACILLIN SODIUM AND TAZOBACTAM SODIUM 4.5 G: 4; .5 INJECTION, POWDER, FOR SOLUTION INTRAVENOUS at 02:03

## 2017-03-14 RX ADMIN — PIPERACILLIN SODIUM AND TAZOBACTAM SODIUM 4.5 G: 4; .5 INJECTION, POWDER, FOR SOLUTION INTRAVENOUS at 11:03

## 2017-03-14 RX ADMIN — DOCUSATE SODIUM 100 MG: 100 CAPSULE, LIQUID FILLED ORAL at 09:03

## 2017-03-14 RX ADMIN — MORPHINE SULFATE 1 MG: 2 INJECTION, SOLUTION INTRAMUSCULAR; INTRAVENOUS at 06:03

## 2017-03-14 RX ADMIN — PIPERACILLIN SODIUM AND TAZOBACTAM SODIUM 4.5 G: 4; .5 INJECTION, POWDER, FOR SOLUTION INTRAVENOUS at 08:03

## 2017-03-14 RX ADMIN — AMLODIPINE BESYLATE 5 MG: 5 TABLET ORAL at 08:03

## 2017-03-14 NOTE — ANESTHESIA POSTPROCEDURE EVALUATION
"Anesthesia Post Evaluation    Patient: Darius Paiz Jr.    Procedure(s) Performed: Procedure(s) (LRB):  INCISION AND DRAINAGE (I&D), HAND (Left)    Final Anesthesia Type: general  Patient location during evaluation: PACU  Patient participation: Yes- Able to Participate  Level of consciousness: awake  Post-procedure vital signs: reviewed and stable  Pain management: adequate  Airway patency: patent  PONV status at discharge: No PONV  Anesthetic complications: no      Cardiovascular status: hemodynamically stable  Respiratory status: unassisted, spontaneous ventilation and room air  Hydration status: euvolemic  Follow-up not needed.        Visit Vitals    BP (!) 147/68    Pulse 82    Temp 36.8 °C (98.2 °F) (Oral)    Resp 15    Ht 5' 3" (1.6 m)    Wt 57.2 kg (126 lb)    SpO2 97%    BMI 22.32 kg/m2       Pain/Danyell Score: Pain Assessment Performed: Yes (3/13/2017  7:25 PM)  Presence of Pain: complains of pain/discomfort (3/13/2017  7:25 PM)  Pain Rating Prior to Med Admin: 5 (3/13/2017  6:57 PM)  Pain Rating Post Med Admin: 2 (3/13/2017  9:24 AM)  Danyell Score: 10 (3/13/2017  7:25 PM)  Modified Danyell Score: 20 (3/13/2017  7:25 PM)      "

## 2017-03-14 NOTE — PROGRESS NOTES
U Internal Medicine Resident MARILU Progress Note    Subjective:      Darius Paiz Jr. is a 86 y.o.  male who is being followed by the U Internal Medicine service at [facility] for left hand cellulitis.     Patient states he slept well overnight.  Is having some left hand pain. I told him he has pain medications PRN, and to let us know when it is bothering him.  Denies any shortness of breath, chest pain, abdominal pain.  S/p I&D on left hand infected hematoma, doing well.      Objective:   Last 24 Hour Vital Signs:  BP  Min: 128/65  Max: 169/72  Temp  Av.1 °F (36.7 °C)  Min: 97.7 °F (36.5 °C)  Max: 98.6 °F (37 °C)  Pulse  Av.7  Min: 63  Max: 94  Resp  Av.1  Min: 10  Max: 20  SpO2  Av %  Min: 96 %  Max: 100 %  I/O last 3 completed shifts:  In: 1551.3 [P.O.:420; I.V.:581.3; IV Piggyback:550]  Out: 1710 [Urine:1710]    Physical Examination:  General:  Lying in bed, resting comfortably  HEENT:  EOMI, PERRL, MMM   CV:  RRR, normal S1/S2, no audible murmur  Resp:  CTAB, no crackles or wheezes appreciated  Abd:  Soft, NT/ND, normoactive bowel sounds  Ext:  Left hand wound s/p I&D, packed, C/D/I.  Scattered ecchymosis to bilateral upper extremities, some improvement in edema since prior exam Pulses:  2+ radial pulses bilaterally  Neuro: sensation intact throughout, range of motion of left wrist/fingers limited secondary to pain and edema yet does have function of all joints     Laboratory:  Laboratory Data Reviewed: yes  Pertinent Findings:    Recent Labs  Lab 17  2310 17  0827 17  0857 17  0805   WBC 9.47 9.09 9.08 10.24   HGB 10.6* 9.4* 9.8* 10.3*   HCT 32.0* 28.4* 30.0* 30.8*    207 186 203   MCV 98 97 98 97   RDW 12.3 12.2 12.3 12.3   * 136 136  --    K 4.6 4.2 4.2  --     109 108  --    CO2 19* 19* 21*  --    BUN 24* 21 22  --    CREATININE 1.3 1.3 1.8*  --    * 95 98  --    PROT 7.3 6.4 6.2  --    ALBUMIN 2.9* 2.6* 2.5*  --     BILITOT 0.7 0.8 0.9  --    AST 13 14 11  --    ALKPHOS 104 91 79  --    ALT 13 11 9*  --      Microbiology Data Reviewed: yes  Pertinent Findings:  Microbiology Results (last 7 days)     Procedure Component Value Units Date/Time    Blood Culture #1 **CANNOT BE ORDERED STAT** [[id number]] Collected:  03/11/17 2310    Order Status:  Completed Specimen:  Blood from Peripheral, Hand, Right Updated:  03/14/17 0812     Blood Culture, Routine No Growth to date     Blood Culture, Routine No Growth to date     Blood Culture, Routine No Growth to date    Blood Culture #2 **CANNOT BE ORDERED STAT** [[id number]] Collected:  03/11/17 2335    Order Status:  Completed Specimen:  Blood from Peripheral, Forearm, Right Updated:  03/14/17 0812     Blood Culture, Routine No Growth to date     Blood Culture, Routine No Growth to date     Blood Culture, Routine No Growth to date    Gram stain [[id number]] Collected:  03/13/17 1748    Order Status:  Sent Specimen:  Incision site from Hand, Left Updated:  03/13/17 2303    Culture, Anaerobe [[id number]] Collected:  03/13/17 1748    Order Status:  Sent Specimen:  Incision site from Hand, Left Updated:  03/13/17 2303    Aerobic culture [[id number]] Collected:  03/13/17 1748    Order Status:  Sent Specimen:  Incision site from Hand, Left Updated:  03/13/17 2303          Other Results:  Radiology Data Reviewed: yes  Pertinent Findings:  X-ray left hand:  No evidence of a fracture or dislocation.  Diffuse soft tissue swelling involving the left hand.    Current Medications:     Infusions:   sodium chloride 0.9%          Scheduled:   amlodipine  5 mg Oral Daily    aspirin  81 mg Oral Daily    atorvastatin  40 mg Oral Daily    cyanocobalamin  1,000 mcg Oral Daily    docusate sodium  100 mg Oral BID    ferrous sulfate  325 mg Oral BID    heparin (porcine)  5,000 Units Subcutaneous Q12H    levetiracetam  500 mg Oral BID    metoprolol tartrate  25 mg Oral BID    piperacillin-tazobactam 4.5 g  in dextrose 5 % 100 mL IVPB (ready to mix system)  4.5 g Intravenous Q8H    sodium chloride 0.9%  3 mL Intravenous Q8H    tamsulosin  0.4 mg Oral Daily        PRN:  acetaminophen, dextrose 50%, dextrose 50%, glucagon (human recombinant), glucose, glucose, insulin aspart, morphine, oxycodone-acetaminophen, sodium chloride 0.9%    Antibiotics and Day Number of Therapy:  Vancomycin (started 3/12)  Zosyn (started 3/11)    Lines and Day Number of Therapy:  PIV    Assessment:     Darius Paiz Jr. is a 86 y.o.male with  Patient Active Problem List    Diagnosis Date Noted    History of CVA (cerebrovascular accident) 03/12/2017    Essential hypertension 03/12/2017    Traumatic hematoma of hand with infection 03/12/2017    Cellulitis of hand, left 03/12/2017    Hand pain, left 03/12/2017    B12 deficiency 01/20/2017    Physical deconditioning     Fall 01/19/2017    Elevated serum creatinine 01/19/2017    Multiple falls 01/19/2017    CVA (cerebral infarction) 04/17/2014    Gait disorder 10/07/2013    BPH (benign prostatic hyperplasia) 06/17/2013    Seizure 08/20/2012        Plan:     Left Hand Cellulitis and Concern for Infected Hematoma:  -Per patient hx - laceration to dorsum of L hand via fingernail on 03/08/2017/  -Progressive swelling noticed over last 4 days; erythema/skin changes noticed 03/10/2017.  -Exam revealing hematoma/laceration noted on dorsum of L hand.   -Sensation, motor function, and pulses remain intact.  -Afebrile. WBC WNL. Sed Rate 118. CRP 37. Lactate 1.3.   -Blood Cultures NGTD.   -Left hand x-ray with no fractures yet diffuse soft tissues swelling.  -Continue Vanc and Zosyn while cultures pending   -Ortho on board  I&D on 3/13, wound cultures sent     HTN  -BP upon admission 137/54.  -Continue home amlodipine 5mg qD and Lopressor 25mg BID.  -Will continue to monitor.     Seizure Disorder  -No acute issues.  -Continue home keppra 500mg BID.     Anemia 2/2 B12 Deficiency  -H/H  10.6/32.0 MCV 98 on arrival, AM labs pending.  -B12 1/2017 <146. Folate 7.7  -colonoscopy is not up to date   -Continuing home cyanocobalamin 1000mcg qD.   -Continuing home ferrous sulfate 325mg BID and colace 100mg BID.      Hx of CVA w/ Dysphagia  -No acute issues/no new focal deficits.  -Continuing home atorvastatin 40mg qD and ASA 81mg qD.   -Continue thickening liquids per home routine.  -NPO for MBSS 3/14     BPH  -Continue home flomax 0.4mg qD.      DM Type II  -Previous A1c 6.3% in 2014.  Patient is not currently on any home medications.  -Repeat A1c 6.7  -Will order SSI and accuchecks.    History of Deconditioning/Frequent Falls:  -Recently discharged from [facility] after 6 weeks of therapy.  -Will consult PT/OT prior to discharge to re-evaluate.     Back Pain:  -Noted to have large, erythematous, pedunculated growth to mid back.  -Consulted wound care for further assessment.      HM: TSH 2.819, A1c 6.7     Diet: NPO for MBSS  DVT PPx: Heparin  Dispo: Pending improvement in L hand s/p I&D 3/13, monitor for improvement     Lucinda Zhou  hospitals Internal Medicine HO-I  U Internal Medicine Service Team A    hospitals Medicine Hospitalist Pager numbers:   U Hospitalist Medicine Team A (Joey/Jo): [phone]  U Hospitalist Medicine Team B (Serenity/Jordan):  [phone]

## 2017-03-14 NOTE — PLAN OF CARE
Problem: Patient Care Overview  Goal: Plan of Care Review  Outcome: Ongoing (interventions implemented as appropriate)  Pt on RA sats 97%.

## 2017-03-14 NOTE — PLAN OF CARE
Patient's daughter in room, updated on discharge plan. Patient's daughter inquired about getting sitter for home. TN gave daughter senior resource guide and numbers for different sitters for home. Patient's daughter also stated the patient will be staying at his granddaughter's for now. [facility] health updated with address. Patient's daughter has wheelchair for patient to use. No further questions from patient or family.

## 2017-03-14 NOTE — PLAN OF CARE
Problem: Patient Care Overview  Goal: Plan of Care Review  Outcome: Ongoing (interventions implemented as appropriate)  Patient is oriented to self. No acute distress noted. No active bleeding at surgical site. NPO after 0001 3/14/2017.Dressing is dry, clean, intact. Left hand is elevated. Patient chokes on his own saliva frequenly, but swallowed pills with thickened water without any problems. Suction is at bedside. HOB elevated at 45 degrees at all time. Turning Q2h.. Patient verbalized understanding the plan of care. Fall precautions maintained. Feet are in protective boots. Bed alarm set, bed in low and locked position. Side rails up x 3, call light within reach. Continue to monitor

## 2017-03-14 NOTE — PLAN OF CARE
"Patient medically stable for discharge home; however, patient[facility]  States he was previously cared for by a "lady friend" who decided to leave yesterday, and she was not prepared to take him home today.  Daughter does not have any of his medications nor a key for his house.  Also concerned that she does not have anyone to stay with the patient tomorrow during the day, as she has a doctor's appointment scheduled for 11AM.  Discussed with Annetta and the patient that tonight's hospital stay may not be covered by insurance.  [facility].  Patient's daughter states she will be prepared for discharge tomorrow.  All prescriptions will be provided.  Referred to  for wound care and patient's granddaughter has purchased a wheelchair for home use.       Kasie Gandhi MD  "

## 2017-03-14 NOTE — CONSULTS
DATE OF CONSULTATION:  03/13/2017.    REASON FOR CONSULT:  Left hand infection.    HISTORY OF PRESENT ILLNESS:  An 86-year-old male with 1-week history of painful   swelling of the left hand.  He initially bumped his hand after a fall, then   scratched it and had increasing redness and pain over the past several days.  He   was admitted over the weekend for cellulitis of left hand and wrist.  No other   injury is reported.  Symptoms have improved a little bit since admission and IV   antibiotic therapy.    PAST MEDICAL HISTORY:  Significant for anemia, BPH, diabetes, stroke, liver   disease, seizures, UTI.    SURGICAL HISTORY:  Includes brain surgery, cataract surgery, gastrostomy tube   placement.    FAMILY HISTORY:  Negative.    SOCIAL HISTORY:  A 40-pack-year history not currently and denies alcohol.    REVIEW OF SYSTEMS:  Negative for fever, chills, rashes.    CURRENT MEDICATIONS:  Reviewed on chart.    ALLERGIES:  None.    PHYSICAL EXAMINATION:  GENERAL:  Elderly male in no acute distress, alert, but hard of hearing,   somewhat of a poor historian.  EXTREMITIES:  Significant for left hand demonstrating redness on the dorsum of   the left hand, bruising both volar and dorsal and fluctuance in the proximal   dorsal aspect of the hand which is tender to touch.  He has a healing laceration   dorsally.  There is no drainage.  Range of motion of the wrist slightly   decreased.  Range of motion of fingers also limited secondary to pain and   swelling.  Sensation intact.    X-RAYS:  AP and lateral of left hand demonstrate no fracture or dislocation.  No   foreign objects.    IMPRESSION:  Probable infected hematoma, left hand dorsal.    PLAN:  I explained the nature of the problem to the patient and family today.  I   have recommended surgical I and D to include wash out of the hematoma and   possible purulent material, also cultures and most likely packing.    Continue IV antibiotics and follow with  cultures.      ERIC/  dd: 03/13/2017 13:16:15 (CDT)  td: 03/13/2017 19:18:52 (CDT)  Doc ID   #[id number]  Job ID #[id number]    CC:

## 2017-03-14 NOTE — PLAN OF CARE
Problem: Patient Care Overview  Goal: Plan of Care Review  Outcome: Ongoing (interventions implemented as appropriate)  No issues, vss

## 2017-03-14 NOTE — DISCHARGE INSTRUCTIONS
If you are over 60 and live in [facility] [place] [facility] and are in need of extra services dealing with your health issues or those of your loved one, please call the [facility] [place] on Aging at [phone].  They may be able to assist you with nutrition, socialization, transportation, regular exercise, medication management and more.

## 2017-03-14 NOTE — PLAN OF CARE
Problem: SLP Goal  Goal: SLP Goal  Short Term Goals:  1. Pt will successfully participate in Modified Barium Swallow study to radiographically assess swallow function, rule out aspiration and determine safest/least restrictive diet. --MET 3/14  2. Patient will tolerate pureed consistency po diet and honey thickened liquids via tsp w/ chin tuck with no overt s/s of aspiration.   Outcome: Ongoing (interventions implemented as appropriate)  3/14/17: [facility] and pt presents with oropharyngeal dysphagia with thin and nectar thickened liquids; oral dysphagia w/ dental soft consistencies. Aspiration noted w/ 5-10cc of thin liquids and nectar thickened liquids. No aspiration noted w/ tsp of honey thickened liquids when utilizing chin tuck. No aspiration noted w/ puree consistencies. Pt exhibited difficulty with bolus propulsion and initiation of swallow w/ dental soft consistencies. RECS: Pureed diet w/ tsp sips of honey thickened liquids w/ chin tuck. (1:1 assist/supervision w/ all PO intake, small bites/sips, no straws--TSP SIPS ONLY and CHIN TUCK, multiple swallows after sips/bites, pt must be sitting upright at 90 degree angle and be fully alert for all PO intake) PLAN: ST will f/u to educate pt's family/caregiver.  ANNMARIE Gonzales. CF-SLP  Speech-Language Pathologist

## 2017-03-14 NOTE — ANESTHESIA RELEASE NOTE
"Anesthesia Release from PACU Note    Patient: Darius Paiz Jr.    Procedure(s) Performed: Procedure(s) (LRB):  INCISION AND DRAINAGE (I&D), HAND (Left)    Anesthesia type: general    Post pain: Adequate analgesia    Post assessment: no apparent anesthetic complications    Last Vitals:   Visit Vitals    BP (!) 147/68    Pulse 82    Temp 36.8 °C (98.2 °F) (Oral)    Resp 15    Ht 5' 3" (1.6 m)    Wt 57.2 kg (126 lb)    SpO2 97%    BMI 22.32 kg/m2       Post vital signs: stable    Level of consciousness: awake    Nausea/Vomiting: no nausea/no vomiting    Complications: none    Airway Patency: patent    Respiratory: unassisted    Cardiovascular: stable    Hydration: euvolemic  "

## 2017-03-14 NOTE — PROGRESS NOTES
POD 1  Doing well  AF VSS  P/E- Left hand with decreased swelling and redness    Cultures pending  Gram stain- Gram + rods    P: OK for Dc home but need to cover possible anaerobes      With clindamycin       Daily dressing changes with xeroform gauze and kerlix wrap

## 2017-03-14 NOTE — PLAN OF CARE
[facility] Discharge Summary    Primary Team: [facility] Team A  Attending Physician: Deyanira Cook MD  Resident: Hiram  Intern: Winnie    Date of Admit: 3/11/2017  Date of Discharge: 3/15/2017    Discharge to: home with home health wound care  Condition: stable    Discharge Diagnoses     Patient Active Problem List   Diagnosis    Seizure    BPH (benign prostatic hyperplasia)    Gait disorder    CVA (cerebral infarction)    Fall    Elevated serum creatinine    Multiple falls    B12 deficiency    Physical deconditioning    History of CVA (cerebrovascular accident)    Essential hypertension    Traumatic hematoma of hand with infection    Cellulitis of hand, left    Hand pain, left    Benign prostatic hyperplasia       Consultants and Procedures     Consultants:  Ortho  dermatology    Procedures:   Left hand I&D    Brief History of Present Illness      Darius Paiz Jr. is a 86 y.o. 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  The patient presented to [facility] on 3/11/2017 with a primary complaint of Hand Injury (Spouse accidentally scratched left hand with fingernail on Wednesday creating a skin tear to top of hand. Now swollen with hematoma. )  .   Mr. Paiz is an 85 y/o M with a PMH significant for DM II, CVA w/ dysphagia, prior subdural hematoma, BPH, Macrocytic Anemia 2/2 to B12 Deficiency, Seizure Disorder, and HTN who presents to the [facility] ED with a chief complaint of L hand injury. Patient was a poor historian and history was obtained from friend, Bryanna Lal, at bedside. Patient was in his USOH until the morning of 3/08/2017 when he was being helped up from his  bed and his friend accidentally scratched the dorsal surface of his L hand with her finger nail. Shortly after, Ms. Lla noticed L hand swelling in Mr. Valentine. She states at that time she applied hydrogen peroxide and alcohol to the wound however Mr. Valentine's L hand continued to swell over the next few days. On Friday, Ms. Lal states she began to notice skin color changes in Mr. Valentine's L hand and wrist. Upon examination, Mr. Valentine denies numbness or tingling, fevers or chills, nausea, vomiting, chest pain, or SOB.    For the full HPI please refer to the History & Physical from this admission.    General: Lying in bed, resting comfortably  HEENT: EOMI, PERRL, MMM   CV: RRR, normal S1/S2, no audible murmur  Resp: CTAB, no crackles or wheezes appreciated  Abd: Soft, NT/ND, normoactive bowel sounds  Ext: Left hand wound s/p I&D, packed, C/D/I. Scattered ecchymosis to bilateral upper extremities, some improvement in edema since prior exam Pulses: 2+ radial pulses bilaterally  Neuro: sensation intact throughout, range of motion of left wrist/fingers limited secondary to pain and edema yet does have function of all joints     Hospital Course By Problem with Pertinent Findings     Left Hand Cellulitis and Concern for Infected Hematoma:  -Per patient hx - laceration to dorsum of L hand via fingernail on 03/08/2017/  -Progressive swelling noticed over last 4 days; erythema/skin changes noticed 03/10/2017.  -Exam revealing hematoma/laceration noted on dorsum of L hand.   -Sensation, motor function, and pulses remain intact.  -Afebrile. WBC WNL. Sed Rate 118. CRP 37. Lactate 1.3.   -Blood Cultures NGTD.   -Left hand x-ray with no fractures yet diffuse soft tissues swelling.  -Continue Vanc and Zosyn while cultures pending   -Ortho on board  I&D on 3/13, wound cultures sent  -will discharge on clinda and xeroform and kirlex wrap, with ortho follow up      HTN  -BP upon admission 137/54.  -Continue home amlodipine 5mg qD and  Lopressor 25mg BID.  -Monitored  stable      Seizure Disorder  -No acute issues.  -Continued home keppra 500mg BID.      Anemia 2/2 B12 Deficiency  -H/H 10.6/32.0 MCV 98 on arrival, AM labs pending.  -B12 1/2017 <146. Folate 7.7  -colonoscopy is not up to date   -Continuing home cyanocobalamin 1000mcg qD.   -Continuing home ferrous sulfate 325mg BID and colace 100mg BID.       Hx of CVA w/ Dysphagia  -No acute issues/no new focal deficits.  -Continuing home atorvastatin 40mg qD and ASA 81mg qD.   -Continue thickening liquids per home routine.  -NPO for MBSS 3/14  recommended thickened liquid with chin tilt  counseled family on risk of aspiration      BPH  -Continue home flomax 0.4mg qD.       DM Type II  -Previous A1c 6.3% in 2014. Patient is not currently on any home medications.  -Repeat A1c 6.7  -Ordered SSI and accuchecks.     History of Deconditioning/Frequent Falls:  -Recently discharged from [facility] after 6 weeks of therapy.  -Will discharge back home with home health wound care     Back Pain:  -Noted to have large, erythematous, pedunculated growth to mid back.  -Consulted wound care for further assessment.     Discharge Medications        Medication List      START taking these medications          clindamycin 300 MG capsule   Commonly known as:  CLEOCIN   Take 1 capsule (300 mg total) by mouth 3 (three) times daily.         CHANGE how you take these medications          aspirin 81 MG EC tablet   Commonly known as:  ECOTRIN   Take 1 tablet (81 mg total) by mouth once daily.   What changed:  how much to take         CONTINUE taking these medications          amlodipine 5 MG tablet   Commonly known as:  NORVASC   Take 1 tablet (5 mg total) by mouth once daily.       cyanocobalamin 1000 MCG tablet   Commonly known as:  VITAMIN B-12   Take 1 tablet (1,000 mcg total) by mouth once daily.       docusate sodium 100 MG capsule   Commonly known as:  COLACE   Take 1 capsule (100 mg total) by mouth 2 (two) times  daily.       ferrous sulfate 324 mg (65 mg iron) Tbec       hydrOXYzine HCl 25 MG tablet   Commonly known as:  ATARAX       levetiracetam 500 MG Tab   Commonly known as:  KEPPRA   TAKE 1 TABLET(500 MG) BY MOUTH TWICE DAILY       metoprolol tartrate 25 MG tablet   Commonly known as:  LOPRESSOR       tamsulosin 0.4 mg Cp24   Commonly known as:  FLOMAX            Where to Get Your Medications      You can get these medications from any pharmacy     Bring a paper prescription for each of these medications     clindamycin 300 MG capsule             Discharge Information:   Diet:  cardiac    Physical Activity:  As tolerated    Instructions:  1. Take all medications as prescribed  2. Keep all follow-up appointments  3. Return to the hospital or call your primary care physicians if any worsening symptoms such as worsening swelling, fever/chills, chest pain, shortness of breath, or any other concerning symptoms occur.    Follow-Up Appointments:  Dr. Vidal, ortho  Dr. Cadena, dermatology  Dr. Hollis, neurology  Dr. Duncan, PCP    Lucinda Zhou  [facility] Internal Medicine, [facility]    3/15 Addendum:  Team spoke with family yesterday, and family had concerns about discharge yesterday, and his care taker would not be available to be with him. His daughter also did not have a key to his home, and felt she needed some time to make arrangements for discharge. She understood that the extra hospital stay may not be covered by insurance, and wished to still stay, and will be prepared for discharge today. She will have arrangements made for patient as well as wheelchair purchased for home use.    Lucinda Zhou MD  03/15/2017  6:29 AM

## 2017-03-14 NOTE — PLAN OF CARE
Discussed with patient and family regarding speech recs after modified barium swallow. The patient is high aspiration risk even with the conservative diatairy recs by speech as follows: RECS: Pureed diet w/ tsp sips of honey thickened liquids w/ chin tuck. (1:1 assist/supervision w/ all PO intake, small bites/sips, no straws--TSP SIPS ONLY and CHIN TUCK, multiple swallows after sips/bites, pt must be sitting upright at 90 degree angle and be fully alert for all PO intake).    These recs were relayed to the family and agreed upon. They will be relayed to the accepting facility on discharge.    Jerome Huff MD  [facility]  HO-1

## 2017-03-14 NOTE — PLAN OF CARE
Patient will be discharged today, home health referral and DME sent to [facility]. TN called Dr. Duncan office and left message to call back with follow-up appointment.     Home Health [employer] [facility] [phone]    Future Appointments  Date Time Provider Department Center   3/20/2017 10:00 AM Juan Manuel Vidal Jr., MD [facility]             03/14/17 1533   Final Note   Assessment Type Final Discharge Note   Discharge Disposition Home-Health   Discharge planning education complete? Yes   What phone number can be called within the next 1-3 days to see how you are doing after discharge? [phone]   Hospital Follow Up  Appt(s) scheduled? Yes   Referral to / orders for Home Health Complete? Yes   Did you assess the readiness or willingness of the family or caregiver to support self management of care? Yes   Right Care Referral Info   Post Acute Recommendation Home-care   Referral Type Referred to [facility]   Facility Name Awaiting facility name      Obdulia Mora RN  Transition Navigator  [phone]

## 2017-03-14 NOTE — OP NOTE
DATE OF PROCEDURE:  03/13/2017    PREOPERATIVE DIAGNOSIS:  Infected hematoma, left hand dorsal.    POSTOPERATIVE DIAGNOSIS:  Infected hematoma, left hand dorsal.    OPERATIVE PROCEDURE:  Irrigation and debridement of hematoma, left hand dorsum.    SURGEON:  Juan Manuel Vidal Jr., M.D.    ANESTHESIA:  General endotracheal.    ESTIMATED BLOOD LOSS:  50 mL.    COMPLICATIONS:  None.    SPECIMENS:  Cultures sent.    BRIEF INDICATIONS:  An 86-year-old male with painful swelling left hand   consistent with hematoma now infected, taken to Surgery for the above procedure.    OPERATIVE PROCEDURE IN DETAIL:  As follows.  After operative consent was   obtained, the patient brought to the Operating Room and placed supine on the   operating room table.  Anesthesia by GET method performed by the Anesthesia   staff.  After the patient was asleep, carefully placed supine on the operating   room table.  Tourniquet applied to the left arm.  Left upper extremity prepped   and draped out in the normal sterile fashion.  Esmarch used to exsanguinate the   limb.  Tourniquet inflated to 225 mmHg.    Following this, an incision was made incorporating a previous laceration on the   dorsum of the left hand with a #15 blade.  Careful dissection used to divide the   subcutaneous tissue.  A large hematoma pocket was identified, which was   completely dorsal on the entire hand.  A large hematoma was removed.  There was   no gross pus.  The cultures were taken.  The wound then thoroughly irrigated   with antibiotic saline solution, removing the remaining fluid and hematoma, skin   was very friable and thin and partially lacerated dorsally.  After thorough   irrigation, hemostasis achieved with Bovie.  The wound then closed loosely with   interrupted 4-0 nylon suture, packed with iodoform gauze and dressed carefully   with Xeroform bulky hand wrap and a volar splint.  Tourniquet deflated.  The   patient was brought to the Recovery Room in stable  condition.  All sponge and   needle counts reported as correct.    ADDENDUM:  Debridement was performed with the scissors and scalpel down to the   fascial layer, did not incorporate bone with sharp excisional debridement.      ERIC/  dd: 03/13/2017 18:29:16 (CDT)  td: 03/13/2017 21:57:18 (CDT)  Doc ID   #[id number]  Job ID #[id number]    CC:

## 2017-03-15 VITALS
OXYGEN SATURATION: 97 % | WEIGHT: 126 LBS | RESPIRATION RATE: 18 BRPM | HEIGHT: 63 IN | HEART RATE: 95 BPM | BODY MASS INDEX: 22.32 KG/M2 | DIASTOLIC BLOOD PRESSURE: 61 MMHG | SYSTOLIC BLOOD PRESSURE: 133 MMHG | TEMPERATURE: 98 F

## 2017-03-15 LAB
ALBUMIN SERPL BCP-MCNC: 2.3 G/DL
ALP SERPL-CCNC: 77 U/L
ALT SERPL W/O P-5'-P-CCNC: 14 U/L
ANION GAP SERPL CALC-SCNC: 9 MMOL/L
AST SERPL-CCNC: 22 U/L
BASOPHILS # BLD AUTO: 0.04 K/UL
BASOPHILS NFR BLD: 0.4 %
BILIRUB SERPL-MCNC: 1 MG/DL
BUN SERPL-MCNC: 19 MG/DL
CALCIUM SERPL-MCNC: 8.3 MG/DL
CHLORIDE SERPL-SCNC: 109 MMOL/L
CO2 SERPL-SCNC: 19 MMOL/L
CREAT SERPL-MCNC: 1.7 MG/DL
DIFFERENTIAL METHOD: ABNORMAL
EOSINOPHIL # BLD AUTO: 0.6 K/UL
EOSINOPHIL NFR BLD: 6 %
ERYTHROCYTE [DISTWIDTH] IN BLOOD BY AUTOMATED COUNT: 12.4 %
EST. GFR  (AFRICAN AMERICAN): 41 ML/MIN/1.73 M^2
EST. GFR  (NON AFRICAN AMERICAN): 36 ML/MIN/1.73 M^2
GLUCOSE SERPL-MCNC: 107 MG/DL
HCT VFR BLD AUTO: 29.4 %
HGB BLD-MCNC: 9.5 G/DL
LYMPHOCYTES # BLD AUTO: 1.7 K/UL
LYMPHOCYTES NFR BLD: 17.2 %
MCH RBC QN AUTO: 31.6 PG
MCHC RBC AUTO-ENTMCNC: 32.3 %
MCV RBC AUTO: 98 FL
MONOCYTES # BLD AUTO: 1.6 K/UL
MONOCYTES NFR BLD: 16.4 %
NEUTROPHILS # BLD AUTO: 5.8 K/UL
NEUTROPHILS NFR BLD: 59.8 %
PLATELET # BLD AUTO: 191 K/UL
PMV BLD AUTO: 9.4 FL
POCT GLUCOSE: 119 MG/DL (ref 70–110)
POCT GLUCOSE: 201 MG/DL (ref 70–110)
POTASSIUM SERPL-SCNC: 4.4 MMOL/L
PROT SERPL-MCNC: 6.2 G/DL
RBC # BLD AUTO: 3.01 M/UL
SODIUM SERPL-SCNC: 137 MMOL/L
VANCOMYCIN SERPL-MCNC: 9.5 UG/ML
WBC # BLD AUTO: 9.7 K/UL

## 2017-03-15 PROCEDURE — 94761 N-INVAS EAR/PLS OXIMETRY MLT: CPT

## 2017-03-15 PROCEDURE — 25000003 PHARM REV CODE 250: Performed by: INTERNAL MEDICINE

## 2017-03-15 PROCEDURE — 63600175 PHARM REV CODE 636 W HCPCS: Performed by: STUDENT IN AN ORGANIZED HEALTH CARE EDUCATION/TRAINING PROGRAM

## 2017-03-15 PROCEDURE — 80202 ASSAY OF VANCOMYCIN: CPT

## 2017-03-15 PROCEDURE — 63600175 PHARM REV CODE 636 W HCPCS: Performed by: INTERNAL MEDICINE

## 2017-03-15 PROCEDURE — G8998 SWALLOW D/C STATUS: HCPCS | Mod: CL

## 2017-03-15 PROCEDURE — 85025 COMPLETE CBC W/AUTO DIFF WBC: CPT

## 2017-03-15 PROCEDURE — 25000003 PHARM REV CODE 250: Performed by: STUDENT IN AN ORGANIZED HEALTH CARE EDUCATION/TRAINING PROGRAM

## 2017-03-15 PROCEDURE — 92526 ORAL FUNCTION THERAPY: CPT

## 2017-03-15 PROCEDURE — 25000003 PHARM REV CODE 250: Performed by: ANESTHESIOLOGY

## 2017-03-15 PROCEDURE — G8997 SWALLOW GOAL STATUS: HCPCS | Mod: CL

## 2017-03-15 PROCEDURE — 36415 COLL VENOUS BLD VENIPUNCTURE: CPT

## 2017-03-15 PROCEDURE — 80053 COMPREHEN METABOLIC PANEL: CPT

## 2017-03-15 RX ADMIN — VANCOMYCIN HYDROCHLORIDE 1 G: 1 INJECTION, POWDER, LYOPHILIZED, FOR SOLUTION INTRAVENOUS at 09:03

## 2017-03-15 RX ADMIN — PIPERACILLIN SODIUM AND TAZOBACTAM SODIUM 4.5 G: 4; .5 INJECTION, POWDER, FOR SOLUTION INTRAVENOUS at 08:03

## 2017-03-15 RX ADMIN — INSULIN ASPART 2 UNITS: 100 INJECTION, SOLUTION INTRAVENOUS; SUBCUTANEOUS at 01:03

## 2017-03-15 RX ADMIN — DOCUSATE SODIUM 100 MG: 100 CAPSULE, LIQUID FILLED ORAL at 09:03

## 2017-03-15 RX ADMIN — TAMSULOSIN HYDROCHLORIDE 0.4 MG: 0.4 CAPSULE ORAL at 09:03

## 2017-03-15 RX ADMIN — LEVETIRACETAM 500 MG: 500 TABLET ORAL at 09:03

## 2017-03-15 RX ADMIN — PIPERACILLIN SODIUM AND TAZOBACTAM SODIUM 4.5 G: 4; .5 INJECTION, POWDER, FOR SOLUTION INTRAVENOUS at 03:03

## 2017-03-15 RX ADMIN — CYANOCOBALAMIN TAB 1000 MCG 1000 MCG: 1000 TAB at 09:03

## 2017-03-15 RX ADMIN — SODIUM CHLORIDE, PRESERVATIVE FREE 3 ML: 5 INJECTION INTRAVENOUS at 06:03

## 2017-03-15 RX ADMIN — HEPARIN SODIUM 5000 UNITS: 5000 INJECTION, SOLUTION INTRAVENOUS; SUBCUTANEOUS at 09:03

## 2017-03-15 RX ADMIN — ATORVASTATIN CALCIUM 40 MG: 40 TABLET, FILM COATED ORAL at 09:03

## 2017-03-15 RX ADMIN — ASPIRIN 81 MG: 81 TABLET, COATED ORAL at 09:03

## 2017-03-15 RX ADMIN — METOPROLOL TARTRATE 25 MG: 25 TABLET ORAL at 09:03

## 2017-03-15 RX ADMIN — AMLODIPINE BESYLATE 5 MG: 5 TABLET ORAL at 09:03

## 2017-03-15 RX ADMIN — FERROUS SULFATE TAB EC 325 MG (65 MG FE EQUIVALENT) 325 MG: 325 (65 FE) TABLET DELAYED RESPONSE at 09:03

## 2017-03-15 RX ADMIN — SODIUM CHLORIDE, PRESERVATIVE FREE 3 ML: 5 INJECTION INTRAVENOUS at 02:03

## 2017-03-15 NOTE — PLAN OF CARE
Patient's daughter Annetta stated she already bought a second-hand wheelchair for patient, and they do not need another wheelchair. TN to inform MD to cancel wheelchair order. [facility] notified of cancel and faxed cancellation to [employer]    Obdulia Mora RN  Transition Navigator  [phone]

## 2017-03-15 NOTE — PT/OT/SLP PROGRESS
Speech Language Pathology  Swallow Treatment    Darius Paiz Jr.   MRN: [id number]   Admitting Diagnosis: Traumatic hematoma of hand with infection    Diet recommendations: Solid Diet Level: Puree  Liquid Diet Level: Honey Thick Feed only when awake/alert, No straws, HOB to 90 degrees, Small bites/sips, Alternating bites/sips, Remain upright 30 minutes post meal, Meds crushed in puree and Assistance with thickening liquids    SLP Treatment Date: 03/15/17  Speech Start Time: 1105     Speech Stop Time: 1121     Speech Total (min): 16 min       TREATMENT BILLABLE MINUTES:  Treatment Swallowing Dysfunction 16    Has the patient been evaluated by SLP for swallowing? : Yes  Keep patient NPO?: No   General Precautions: Standard, fall  Current Respiratory Status:  (room air)       Subjective:  Pt found alone in room in bed. Pt was awake and alert. No family at bedside per RN. Pt reported to SLP he was not sure where his sitters were and if they were coming back.      Pain Ratin/10       Objective:   Patient found with: bed alarm, arterial line  Pt participated in direct dysphagia with puree/honey thick liquids. Pt tolerated puree textures (apple sauce) fed by SLP x3. Pt requested milk. Pt tolerated honey thick liquids (thickened milk) x7 spoon fed by SLP. Pt tolerated puree/honey thick liquids with no overt s/s of aspiration, no coughing/choking, and no change in voice.     FIM:              Comprehension: 5 Standby Prompting--The patient understands directions and conversation about basic daily needs more than 90% of the time.  The patient requires prompting (slowed speech rate, use of repetition, stressing particular words or phrases, pauses, visual or   Expression: 5 Standby Prompting--The patient expresses basic daily needs and ideas more than 90% of the time.  Requires prompting (e.g., frequent repetition) less than 10% of the time to be understood.          Assessment:  Darius Paiz Jr. is a 86 y.o. male with a  medical diagnosis of Traumatic hematoma of hand with infection and presents with dysphagia. Pt safe for puree/honey thick liquids at this time. SLP recommends HH ST services upon discharge for direct and indirect dysphagia tx for management of swallowing function, and to increase compliance with thickened liquids. SLP will f/u next date.    Discharge recommendations: Discharge Facility/Level Of Care Needs: home with home health (ST services)     Goals:   SLP Goals        Problem: SLP Goal    Goal Priority Disciplines Outcome   SLP Goal     SLP Ongoing (interventions implemented as appropriate)   Description:  Short Term Goals:  1. Pt will successfully participate in Modified Barium Swallow study to radiographically assess swallow function, rule out aspiration and determine safest/least restrictive diet. --MET 3/14  2. Patient will tolerate pureed consistency po diet and honey thickened liquids via tsp w/ chin tuck with no overt s/s of aspiration.  -ongoing                  Plan:   Patient to be seen Therapy Frequency: 3 x/week   Plan of Care expires: 04/11/17  Plan of Care reviewed with: patient (RN)  SLP Follow-up?: Yes  SLP - Next Visit Date: 03/16/17          THONG Josue  SLP- Clinician   3/15/2017

## 2017-03-15 NOTE — PLAN OF CARE
Problem: SLP Goal  Goal: SLP Goal  Short Term Goals:  1. Pt will successfully participate in Modified Barium Swallow study to radiographically assess swallow function, rule out aspiration and determine safest/least restrictive diet. --MET 3/14  2. Patient will tolerate pureed consistency po diet and honey thickened liquids via tsp w/ chin tuck with no overt s/s of aspiration. -ongoing   Outcome: Ongoing (interventions implemented as appropriate)  3/15/17: Pt participated in direct dysphagia tx this am. Pt tolerated puree textures (apple sauce) fed by SLP x3. Pt requested milk. Pt tolerated honey thick liquids (thickened milk) x7 spoon fed by SLP. Pt tolerated puree/honey thick liquids with no overt s/s of aspiration, no coughing/choking, and no change in voice.   DISHA Josue.  SLP- Clinician

## 2017-03-15 NOTE — PLAN OF CARE
Problem: Patient Care Overview  Goal: Plan of Care Review  Room air SpO2  98 %. Pt with no apparent distress noted. Will continue to monitor.

## 2017-03-15 NOTE — DISCHARGE SUMMARY
[facility] IM Discharge Summary     Primary Team: [facility] Team A  Attending Physician: Deyanira Cook MD  Resident: Hiram  Intern: Winnie     Date of Admit: 3/11/2017  Date of Discharge: 3/15/2017     Discharge to: home with home health wound care  Condition: stable     Discharge Diagnoses          Patient Active Problem List   Diagnosis    Seizure    BPH (benign prostatic hyperplasia)    Gait disorder    CVA (cerebral infarction)    Fall    Elevated serum creatinine    Multiple falls    B12 deficiency    Physical deconditioning    History of CVA (cerebrovascular accident)    Essential hypertension    Traumatic hematoma of hand with infection    Cellulitis of hand, left    Hand pain, left    Benign prostatic hyperplasia         Consultants and Procedures      Consultants:  Ortho  dermatology     Procedures:   Left hand I&D     Brief History of Present Illness       Darius Paiz Jr. is a 86 y.o. 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  The patient presented to [facility] on 3/11/2017 with a primary complaint of Hand Injury (Spouse accidentally scratched left hand with fingernail on Wednesday creating a skin tear to top of hand. Now swollen with hematoma. )  .   Mr. Paiz is an 85 y/o M with a PMH significant for DM II, CVA w/ dysphagia, prior subdural hematoma, BPH, Macrocytic Anemia 2/2 to B12 Deficiency, Seizure Disorder, and HTN who presents to the [facility] ED with a chief complaint of L hand injury. Patient was a poor historian and history was obtained from friend, Bryanna Lal, at bedside. Patient was in his USOH until the morning of 3/08/2017 when he was being  helped up from his bed and his friend accidentally scratched the dorsal surface of his L hand with her finger nail. Shortly after, Ms. Lal noticed L hand swelling in Mr. Valentine. She states at that time she applied hydrogen peroxide and alcohol to the wound however Mr. Valentine's L hand continued to swell over the next few days. On Friday, Ms. Lal states she began to notice skin color changes in Mr. Valentine's L hand and wrist. Upon examination, Mr. Valentine denies numbness or tingling, fevers or chills, nausea, vomiting, chest pain, or SOB.     For the full HPI please refer to the History & Physical from this admission.     General: Lying in bed, resting comfortably  HEENT: EOMI, PERRL, MMM   CV: RRR, normal S1/S2, no audible murmur  Resp: CTAB, no crackles or wheezes appreciated  Abd: Soft, NT/ND, normoactive bowel sounds  Ext: Left hand wound s/p I&D, packed, C/D/I. Scattered ecchymosis to bilateral upper extremities, some improvement in edema since prior exam Pulses: 2+ radial pulses bilaterally  Neuro: sensation intact throughout, range of motion of left wrist/fingers limited secondary to pain and edema yet does have function of all joints      Hospital Course By Problem with Pertinent Findings      Left Hand Cellulitis and Concern for Infected Hematoma:  -Per patient hx - laceration to dorsum of L hand via fingernail on 03/08/2017/  -Progressive swelling noticed over last 4 days; erythema/skin changes noticed 03/10/2017.  -Exam revealing hematoma/laceration noted on dorsum of L hand.   -Sensation, motor function, and pulses remain intact.  -Afebrile. WBC WNL. Sed Rate 118. CRP 37. Lactate 1.3.   -Blood Cultures NGTD.   -Left hand x-ray with no fractures yet diffuse soft tissues swelling.  -Continue Vanc and Zosyn while cultures pending   -Ortho on board  I&D on 3/13, wound cultures sent  -will discharge on clinda and xeroform and kirlex wrap, with ortho follow up      HTN  -BP upon admission 137/54.  -Continue  home amlodipine 5mg qD and Lopressor 25mg BID.  -Monitored  stable      Seizure Disorder  -No acute issues.  -Continued home keppra 500mg BID.      Anemia 2/2 B12 Deficiency  -H/H 10.6/32.0 MCV 98 on arrival, AM labs pending.  -B12 1/2017 <146. Folate 7.7  -colonoscopy is not up to date   -Continuing home cyanocobalamin 1000mcg qD.   -Continuing home ferrous sulfate 325mg BID and colace 100mg BID.       Hx of CVA w/ Dysphagia  -No acute issues/no new focal deficits.  -Continuing home atorvastatin 40mg qD and ASA 81mg qD.   -Continue thickening liquids per home routine.  -NPO for MBSS 3/14  recommended thickened liquid with chin tilt  counseled family on risk of aspiration      BPH  -Continue home flomax 0.4mg qD.       DM Type II  -Previous A1c 6.3% in 2014. Patient is not currently on any home medications.  -Repeat A1c 6.7  -Ordered SSI and accuchecks.      History of Deconditioning/Frequent Falls:  -Recently discharged from [facility] after 6 weeks of therapy.  -Will discharge back home with home health wound care      Back Pain:  -Noted to have large, erythematous, pedunculated growth to mid back.  -Consulted wound care for further assessment.      Discharge Medications          Medication List       START taking these medications            clindamycin 300 MG capsule   Commonly known as: CLEOCIN   Take 1 capsule (300 mg total) by mouth 3 (three) times daily.          CHANGE how you take these medications            aspirin 81 MG EC tablet   Commonly known as: ECOTRIN   Take 1 tablet (81 mg total) by mouth once daily.   What changed: how much to take          CONTINUE taking these medications            amlodipine 5 MG tablet   Commonly known as: NORVASC   Take 1 tablet (5 mg total) by mouth once daily.         cyanocobalamin 1000 MCG tablet   Commonly known as: VITAMIN B-12   Take 1 tablet (1,000 mcg total) by mouth once daily.         docusate sodium 100 MG capsule   Commonly known as: COLACE   Take 1  capsule (100 mg total) by mouth 2 (two) times daily.         ferrous sulfate 324 mg (65 mg iron) Tbec         hydrOXYzine HCl 25 MG tablet   Commonly known as: ATARAX         levetiracetam 500 MG Tab   Commonly known as: KEPPRA   TAKE 1 TABLET(500 MG) BY MOUTH TWICE DAILY         metoprolol tartrate 25 MG tablet   Commonly known as: LOPRESSOR         tamsulosin 0.4 mg Cp24   Commonly known as: FLOMAX                        Where to Get Your Medications            You can get these medications from any pharmacy      Bring a paper prescription for each of these medications       clindamycin 300 MG capsule                  Discharge Information:   Diet:  cardiac     Physical Activity:  As tolerated     Instructions:  1. Take all medications as prescribed  2. Keep all follow-up appointments  3. Return to the hospital or call your primary care physicians if any worsening symptoms such as worsening swelling, fever/chills, chest pain, shortness of breath, or any other concerning symptoms occur.     Follow-Up Appointments:  Dr. Vidal, ortho  Dr. Cadena, dermatology  Dr. Hollis, neurology  Dr. Duncan, PCP     Lucinda Zhou  [facility] Internal Medicine, [facility]     3/15 Addendum:  Team spoke with family yesterday, and family had concerns about discharge yesterday, and his care taker would not be available to be with him. His daughter also did not have a key to his home, and felt she needed some time to make arrangements for discharge. She understood that the extra hospital stay may not be covered by insurance, and wished to still stay, and will be prepared for discharge today. She will have arrangements made for patient as well as wheelchair purchased for home use.     Lucinda Zhou MD  03/15/2017  6:29 AM

## 2017-03-15 NOTE — PLAN OF CARE
Patient discharged today, home health information already sent.          03/15/17 1225   Final Note   Assessment Type Final Discharge Note   Discharge Disposition Home-Health   Discharge planning education complete? Yes   Did you assess the readiness or willingness of the family or caregiver to support self management of care? Yes       Obdulia Mora RN  Transition Navigator  [phone]

## 2017-03-15 NOTE — PLAN OF CARE
Discharged home with instructions and prescriptions after seen by dr Cook. Left hand dressing dry and intact. Seen by Speech. Tolerating thickened po intake. No ss of Seizures.Family to pick patient up after finishing work after 5 pm

## 2017-03-15 NOTE — PLAN OF CARE
Problem: SLP Goal  Goal: SLP Goal  Short Term Goals:  1. Pt will successfully participate in Modified Barium Swallow study to radiographically assess swallow function, rule out aspiration and determine safest/least restrictive diet. --MET 3/14  2. Patient will tolerate pureed consistency po diet and honey thickened liquids via tsp w/ chin tuck with no overt s/s of aspiration. MET 3/15  Outcome: Outcome(s) achieved Date Met:  03/15/17  3/15/17: Pt participated in direct dysphagia tx this am. Pt tolerated puree textures (apple sauce) fed by SLP x3. Pt requested milk. Pt tolerated honey thick liquids (thickened milk) x7 spoon fed by SLP. Pt tolerated puree/honey thick liquids with no overt s/s of aspiration, no coughing/choking, and no change in voice.   DISHA Josue.  SLP- Clinician

## 2017-03-15 NOTE — PLAN OF CARE
Problem: Patient Care Overview  Goal: Plan of Care Review  Outcome: Ongoing (interventions implemented as appropriate)  No acute distress noted. Turn q 2h with assistance. Patient is free from falls. Room near [facility] station. Bed alarm set, bed in low and locked position, side rails up x 2, call light within reach. Continue to monitor

## 2017-03-17 LAB
BACTERIA BLD CULT: NORMAL
BACTERIA BLD CULT: NORMAL
BACTERIA SPEC AEROBE CULT: NO GROWTH

## 2017-03-20 ENCOUNTER — OFFICE VISIT (OUTPATIENT)
Dept: ORTHOPEDICS | Facility: CLINIC | Age: 82
End: 2017-03-20
Payer: MEDICARE

## 2017-03-20 VITALS — HEIGHT: 63 IN | WEIGHT: 126 LBS | BODY MASS INDEX: 22.32 KG/M2

## 2017-03-20 DIAGNOSIS — L03.114 CELLULITIS OF HAND, LEFT: Primary | ICD-10-CM

## 2017-03-20 DIAGNOSIS — S60.222D: ICD-10-CM

## 2017-03-20 DIAGNOSIS — L08.9: ICD-10-CM

## 2017-03-20 PROCEDURE — 99999 PR PBB SHADOW E&M-EST. PATIENT-LVL II: CPT | Mod: PBBFAC,,, | Performed by: ORTHOPAEDIC SURGERY

## 2017-03-20 PROCEDURE — 99024 POSTOP FOLLOW-UP VISIT: CPT | Mod: S$GLB,,, | Performed by: ORTHOPAEDIC SURGERY

## 2017-03-20 RX ORDER — CLINDAMYCIN HYDROCHLORIDE 300 MG/1
300 CAPSULE ORAL 2 TIMES DAILY
Qty: 14 CAPSULE | Refills: 0 | Status: SHIPPED | OUTPATIENT
Start: 2017-03-20 | End: 2017-03-27

## 2017-03-20 RX ORDER — CLOTRIMAZOLE AND BETAMETHASONE DIPROPIONATE 10; .64 MG/G; MG/G
CREAM TOPICAL 2 TIMES DAILY
Qty: 1 TUBE | Refills: 1 | Status: SHIPPED | OUTPATIENT
Start: 2017-03-20 | End: 2018-05-17

## 2017-03-20 NOTE — PROGRESS NOTES
HISTORY OF PRESENT ILLNESS:  Mr. Paiz is one week out from I and D infected   hematoma, left hand.  He is doing well, brought by family members today in a   wheelchair.    PHYSICAL EXAMINATION:  LEFT HAND:  The incision looks good.  A little bit of bloody drainage is noted.    No gross pus.  Range of motion of fingers slightly decreased.  Sutures are in   place.  Skin is very friable.    PLAN:  I have elected to leave the sutures in for an additional week.    Instructed in appropriate wound care, light wrap and Neosporin.  We will also   give him a wrist splint for protection.  On an unrelated matter, he is having   some diaper rash issues, so we have prescribed medication for that.  Follow up   in one week.      BEL  dd: 03/20/2017 09:03:10 (CDT)  td: 03/21/2017 04:08:49 (CDT)  Doc ID   #[id number]  Job ID #[id number]    CC:

## 2017-03-20 NOTE — MR AVS SNAPSHOT
[address]  [place] [address]  Phone: [phone]                  Darius Paiz    3/20/2017 8:40 AM   Office Visit    Description:  Male : 1930   Provider:  Juan Manuel Vidal Jr., MD   Department:  [facility]           Reason for Visit     Left Hand - Post-op Evaluation           Diagnoses this Visit        Comments    Cellulitis of hand, left    -  Primary     Traumatic hematoma of hand with infection, left, subsequent encounter                To Do List           Future Appointments        Provider Department Dept Phone    3/28/2017 8:20 AM Juan Manuel Vidal Jr., MD [facility] [phone]      Goals (5 Years of Data)     None       These Medications        Disp Refills Start End    clindamycin (CLEOCIN) 300 MG capsule 14 capsule 0 3/20/2017 3/27/2017    Take 1 capsule (300 mg total) by mouth 2 (two) times daily. - Oral    Pharmacy: [facility] [address] [place] [address] [place] [address] AT [facility] Ph #: [phone]       clotrimazole-betamethasone 1-0.05% (LOTRISONE) cream 1 Tube 1 3/20/2017     Apply topically 2 (two) times daily. - Topical (Top)    Pharmacy: [facility] [address] [place] [address] [place] [id number] AT [facility] Ph #: [phone]         [facility]     [facility] -  Assistance  Registered nurses in the [facility] Center provide clinical advisement, health education, appointment booking, and other advisory services.  Call for this free service at [phone].             Medications           Message regarding Medications     Verify the changes and/or additions to your medication regime listed below are the same as discussed with your clinician today.  If any of these changes or additions are incorrect, please notify your healthcare provider.        START taking these NEW medications        Refills     "clotrimazole-betamethasone 1-0.05% (LOTRISONE) cream 1    Sig: Apply topically 2 (two) times daily.    Class: Print    Route: Topical (Top)      CHANGE how you are taking these medications     Start Taking Instead of    clindamycin (CLEOCIN) 300 MG capsule clindamycin (CLEOCIN) 300 MG capsule    Dosage:  Take 1 capsule (300 mg total) by mouth 2 (two) times daily. Dosage:  Take 1 capsule (300 mg total) by mouth 3 (three) times daily.    Reason for Change:  Reorder            Verify that the below list of medications is an accurate representation of the medications you are currently taking.  If none reported, the list may be blank. If incorrect, please contact your healthcare provider. Carry this list with you in case of emergency.           Current Medications     amlodipine (NORVASC) 5 MG tablet Take 1 tablet (5 mg total) by mouth once daily.    aspirin (ECOTRIN) 81 MG EC tablet Take 1 tablet (81 mg total) by mouth once daily.    clindamycin (CLEOCIN) 300 MG capsule Take 1 capsule (300 mg total) by mouth 2 (two) times daily.    clotrimazole-betamethasone 1-0.05% (LOTRISONE) cream Apply topically 2 (two) times daily.    cyanocobalamin (VITAMIN B-12) 1000 MCG tablet Take 1 tablet (1,000 mcg total) by mouth once daily.    docusate sodium (COLACE) 100 MG capsule Take 1 capsule (100 mg total) by mouth 2 (two) times daily.    ferrous sulfate 324 mg (65 mg iron) TbEC Take 1 tablet (325 mg total) by mouth 2 (two) times daily.    hydrOXYzine HCl (ATARAX) 25 MG tablet Take 1 tablet (25 mg total) by mouth 3 (three) times daily as needed.    levetiracetam (KEPPRA) 500 MG Tab Take 1 tablet (500 mg total) by mouth 2 (two) times daily.    metoprolol tartrate (LOPRESSOR) 25 MG tablet Take 1 tablet (25 mg total) by mouth 2 (two) times daily.    tamsulosin (FLOMAX) 0.4 mg Cp24 Take 1 capsule (0.4 mg total) by mouth once daily.           Clinical Reference Information           Your Vitals Were     Height Weight BMI          5' 3" " (1.6 m) 57.2 kg (126 lb) 22.32 kg/m2        Allergies as of 3/20/2017     No Known Allergies      Immunizations Administered on Date of Encounter - 3/20/2017     None      [facility] Sign-Up     Activating your [facility] account is as easy as 1-2-3!     1) Visit [email or web address], select Sign Up Now, enter this activation code and your date of birth, then select Next.  [email or web address]  Expires: 4/29/2017  2:15 PM      2) Create a username and password to use when you visit [facility] in the future and select a security question in case you lose your password and select Next.    3) Enter your e-mail address and click Sign Up!    Additional Information  If you have questions, please e-mail [email or web address] or call [phone] to talk to our [facility] staff. Remember, [facility] is NOT to be used for urgent needs. For medical emergencies, dial 911.         Language Assistance Services     ATTENTION: Language assistance services are available, free of charge. Please call [phone].      ATENCIÓN: Si habla español, tiene a pina disposición servicios gratuitos de asistencia lingüística. Llame al [phone].     CHÚ Ý: N?u b?n nói Ti?ng Vi?t, có các d?ch v? h? tr? ngôn ng? mi?n phí dành cho b?n. G?i s? [phone].         [facility] complies with applicable Federal civil rights laws and does not discriminate on the basis of race, color, national origin, age, disability, or sex.

## 2017-03-22 LAB — BACTERIA SPEC ANAEROBE CULT: NORMAL

## 2017-03-28 ENCOUNTER — OFFICE VISIT (OUTPATIENT)
Dept: ORTHOPEDICS | Facility: CLINIC | Age: 82
End: 2017-03-28
Payer: MEDICARE

## 2017-03-28 VITALS — BODY MASS INDEX: 20.91 KG/M2 | HEIGHT: 63 IN | WEIGHT: 118 LBS

## 2017-03-28 DIAGNOSIS — L08.9: Primary | ICD-10-CM

## 2017-03-28 DIAGNOSIS — S60.222D: Primary | ICD-10-CM

## 2017-03-28 PROCEDURE — 99999 PR PBB SHADOW E&M-EST. PATIENT-LVL II: CPT | Mod: PBBFAC,,, | Performed by: ORTHOPAEDIC SURGERY

## 2017-03-28 PROCEDURE — 99024 POSTOP FOLLOW-UP VISIT: CPT | Mod: S$GLB,,, | Performed by: ORTHOPAEDIC SURGERY

## 2017-03-28 NOTE — MR AVS SNAPSHOT
[facility]  [address] [place] [address] [address]  [place] [address]  Phone: [phone]                  Darius Paiz    3/28/2017 8:20 AM   Office Visit    Description:  Male : 1930   Provider:  Juan Manuel Vidal Jr., MD   Department:  [facility]           Reason for Visit     Left Hand - Pain           Diagnoses this Visit        Comments    Traumatic hematoma of hand with infection, left, subsequent encounter    -  Primary            To Do List           Future Appointments        Provider Department Dept Phone    2017 8:40 AM Juan Manuel Vidal Jr., MD [facility] [phone]      Goals (5 Years of Data)     None      [facility]     [facility] On Call Nurse Care Line -  Assistance  Registered nurses in the [facility] Center provide clinical advisement, health education, appointment booking, and other advisory services.  Call for this free service at [phone].             Medications           Message regarding Medications     Verify the changes and/or additions to your medication regime listed below are the same as discussed with your clinician today.  If any of these changes or additions are incorrect, please notify your healthcare provider.             Verify that the below list of medications is an accurate representation of the medications you are currently taking.  If none reported, the list may be blank. If incorrect, please contact your healthcare provider. Carry this list with you in case of emergency.           Current Medications     amlodipine (NORVASC) 5 MG tablet Take 1 tablet (5 mg total) by mouth once daily.    aspirin (ECOTRIN) 81 MG EC tablet Take 1 tablet (81 mg total) by mouth once daily.    clotrimazole-betamethasone 1-0.05% (LOTRISONE) cream Apply topically 2 (two) times daily.    cyanocobalamin (VITAMIN B-12) 1000 MCG tablet Take 1 tablet (1,000 mcg total) by mouth once daily.    docusate sodium (COLACE) 100 MG capsule Take 1  "capsule (100 mg total) by mouth 2 (two) times daily.    ferrous sulfate 324 mg (65 mg iron) TbEC Take 1 tablet (325 mg total) by mouth 2 (two) times daily.    hydrOXYzine HCl (ATARAX) 25 MG tablet Take 1 tablet (25 mg total) by mouth 3 (three) times daily as needed.    levetiracetam (KEPPRA) 500 MG Tab Take 1 tablet (500 mg total) by mouth 2 (two) times daily.    metoprolol tartrate (LOPRESSOR) 25 MG tablet Take 1 tablet (25 mg total) by mouth 2 (two) times daily.    tamsulosin (FLOMAX) 0.4 mg Cp24 Take 1 capsule (0.4 mg total) by mouth once daily.           Clinical Reference Information           Your Vitals Were     Height Weight BMI          5' 3" (1.6 m) 53.5 kg (118 lb) 20.9 kg/m2        Allergies as of 3/28/2017     No Known Allergies      Immunizations Administered on Date of Encounter - 3/28/2017     None      [facility] Sign-Up     Activating your [facility] account is as easy as 1-2-3!     1) Visit [email or web address], select Sign Up Now, enter this activation code and your date of birth, then select Next.  [email or web address]  Expires: 4/29/2017  2:15 PM      2) Create a username and password to use when you visit [facility] in the future and select a security question in case you lose your password and select Next.    3) Enter your e-mail address and click Sign Up!    Additional Information  If you have questions, please e-mail [email or web address] or call [phone] to talk to our [facility] staff. Remember, [facility] is NOT to be used for urgent needs. For medical emergencies, dial 911.         Language Assistance Services     ATTENTION: Language assistance services are available, free of charge. Please call [phone].      ATENCIÓN: Si habla español, tiene a pina disposición servicios gratuitos de asistencia lingüística. Llame al [phone].     CHÚ Ý: N?u b?n nói Ti?ng Vi?t, có các d?ch v? h? tr? ngôn ng? mi?n phí dành cho b?n. G?i s? [phone].         [facility] complies with " applicable Federal civil rights laws and does not discriminate on the basis of race, color, national origin, age, disability, or sex.

## 2017-03-28 NOTE — PROGRESS NOTES
Mr. Paiz in followup I and D of infected hematoma of the left hand.  He is   about two weeks postop, still has some sutures in place, brought from the   nursing home.    PHYSICAL EXAMINATION:  LEFT HAND:  Hand looks much better.  Swelling is down.  No redness, no evidence   of infection.  Remaining sutures present.    PLAN:  Remaining sutures removed today without difficulty.  Instructed on   appropriate wound care.  We will also order some therapy for the left hand for   range of motion and gentle strengthening.  Follow up in one month.      BEL  dd: 03/28/2017 08:39:11 (CDT)  td: 03/29/2017 03:45:00 (CDT)  Doc ID   #[id number]  Job ID #[id number]    CC:

## 2017-04-12 ENCOUNTER — TELEPHONE (OUTPATIENT)
Dept: ORTHOPEDICS | Facility: CLINIC | Age: 82
End: 2017-04-12

## 2017-04-12 NOTE — TELEPHONE ENCOUNTER
----- Message from Ronna Marti sent at 4/12/2017 10:11 AM CDT -----  Contact: Bryanna/[phone]  Bryanna said she would like to speak with you regarding the patient's appointment on 4/17/17. She said the patient told her he did not have to come in for the appointment . Please advise

## 2017-04-13 ENCOUNTER — TELEPHONE (OUTPATIENT)
Dept: ORTHOPEDICS | Facility: CLINIC | Age: 82
End: 2017-04-13

## 2017-04-13 NOTE — TELEPHONE ENCOUNTER
----- Message from Julianne Marin sent at 4/13/2017  9:10 AM CDT -----  Contact: Self [phone]  Patient Returning Your Phone Call

## 2017-04-17 ENCOUNTER — OFFICE VISIT (OUTPATIENT)
Dept: ORTHOPEDICS | Facility: CLINIC | Age: 82
End: 2017-04-17
Payer: MEDICARE

## 2017-04-17 VITALS — BODY MASS INDEX: 20.91 KG/M2 | WEIGHT: 118 LBS | HEIGHT: 63 IN

## 2017-04-17 DIAGNOSIS — L03.114 CELLULITIS OF HAND, LEFT: Primary | ICD-10-CM

## 2017-04-17 PROCEDURE — 99024 POSTOP FOLLOW-UP VISIT: CPT | Mod: S$GLB,,, | Performed by: ORTHOPAEDIC SURGERY

## 2017-04-17 PROCEDURE — 99999 PR PBB SHADOW E&M-EST. PATIENT-LVL II: CPT | Mod: PBBFAC,,, | Performed by: ORTHOPAEDIC SURGERY

## 2017-04-17 NOTE — PROGRESS NOTES
HISTORY OF PRESENT ILLNESS:  Mr. Paiz is about one month out, I and D hematoma   infected left hand.  He is doing well.  He is walking with a walker now.  No   pain reported.    PHYSICAL EXAMINATION:  LEFT HAND:  The swelling is down.  No tenderness.  No evidence of infection.    Incision well healed.  Range of motion full.   strength slightly decreased.    PLAN:  I recommended that he continue strengthening left hand with a squeeze   ball, keep an eye on his symptoms.  Follow up as needed only.      BEL  dd: 04/17/2017 08:37:18 (CDT)  td: 04/17/2017 09:03:03 (CDT)  Doc ID   #[id number]  Job ID #[id number]    CC:

## 2017-09-22 RX ORDER — LEVETIRACETAM 500 MG/1
TABLET ORAL
Qty: 60 TABLET | Refills: 0 | Status: SHIPPED | OUTPATIENT
Start: 2017-09-22 | End: 2017-11-20 | Stop reason: SDUPTHER

## 2017-11-20 RX ORDER — LEVETIRACETAM 500 MG/1
500 TABLET ORAL 2 TIMES DAILY
Qty: 60 TABLET | Refills: 0 | Status: SHIPPED | OUTPATIENT
Start: 2017-11-20 | End: 2018-01-24 | Stop reason: SDUPTHER

## 2018-01-24 RX ORDER — LEVETIRACETAM 500 MG/1
TABLET ORAL
Qty: 60 TABLET | Refills: 0 | Status: SHIPPED | OUTPATIENT
Start: 2018-01-24 | End: 2018-03-20 | Stop reason: SDUPTHER

## 2018-03-20 RX ORDER — LEVETIRACETAM 500 MG/1
TABLET ORAL
Qty: 60 TABLET | Refills: 0 | Status: SHIPPED | OUTPATIENT
Start: 2018-03-20 | End: 2018-05-24 | Stop reason: SDUPTHER

## 2018-05-17 PROBLEM — N18.30 TYPE 2 DIABETES MELLITUS WITH STAGE 3 CHRONIC KIDNEY DISEASE, WITHOUT LONG-TERM CURRENT USE OF INSULIN: Status: ACTIVE | Noted: 2018-05-17

## 2018-05-17 PROBLEM — N18.30 ANEMIA IN STAGE 3 CHRONIC KIDNEY DISEASE: Status: ACTIVE | Noted: 2018-05-17

## 2018-05-17 PROBLEM — E11.22 TYPE 2 DIABETES MELLITUS WITH STAGE 3 CHRONIC KIDNEY DISEASE, WITHOUT LONG-TERM CURRENT USE OF INSULIN: Status: ACTIVE | Noted: 2018-05-17

## 2018-05-17 PROBLEM — D63.1 ANEMIA IN STAGE 3 CHRONIC KIDNEY DISEASE: Status: ACTIVE | Noted: 2018-05-17

## 2018-05-17 PROBLEM — N25.81 SECONDARY HYPERPARATHYROIDISM OF RENAL ORIGIN: Status: ACTIVE | Noted: 2018-05-17

## 2018-05-17 PROBLEM — N18.30 CHRONIC KIDNEY DISEASE, STAGE III (MODERATE): Status: ACTIVE | Noted: 2018-05-17

## 2018-05-25 RX ORDER — LEVETIRACETAM 500 MG/1
TABLET ORAL
Qty: 60 TABLET | Refills: 0 | Status: SHIPPED | OUTPATIENT
Start: 2018-05-25 | End: 2018-07-23 | Stop reason: SDUPTHER

## 2018-07-23 RX ORDER — LEVETIRACETAM 500 MG/1
TABLET ORAL
Qty: 60 TABLET | Refills: 0 | Status: SHIPPED | OUTPATIENT
Start: 2018-07-23 | End: 2018-10-17 | Stop reason: SDUPTHER

## 2018-10-17 RX ORDER — LEVETIRACETAM 500 MG/1
TABLET ORAL
Qty: 60 TABLET | Refills: 0 | Status: SHIPPED | OUTPATIENT
Start: 2018-10-17 | End: 2018-12-11 | Stop reason: SDUPTHER

## 2018-12-11 RX ORDER — LEVETIRACETAM 500 MG/1
TABLET ORAL
Qty: 60 TABLET | Refills: 0 | Status: SHIPPED | OUTPATIENT
Start: 2018-12-11 | End: 2019-01-17 | Stop reason: SDUPTHER

## 2018-12-12 NOTE — TELEPHONE ENCOUNTER
----- Message from Elmer Morris sent at 12/12/2018  9:37 AM CST -----  Needs Advice    Reason for call: Bryanna states levETIRAcetam (KEPPRA) 500 MG Tab is not available at [facility] in [place], and she's asking it go to pharmacy below        Communication Preference: Bryanna Lal (care giver) @ work: [phone] or home: [phone]    Additional Information: Bryanna is asking for a call back once the script has been sent    [facility] [address] [place] [address] ([place] [place] [facility] [address] [facility]  [address] [facility] [place] [address]  Phone: [phone] Fax: [phone]

## 2019-01-17 ENCOUNTER — OFFICE VISIT (OUTPATIENT)
Dept: NEUROLOGY | Facility: CLINIC | Age: 84
End: 2019-01-17
Payer: MEDICARE

## 2019-01-17 VITALS — BODY MASS INDEX: 20.57 KG/M2 | WEIGHT: 128 LBS | HEIGHT: 66 IN

## 2019-01-17 DIAGNOSIS — R56.9 SEIZURE: Primary | ICD-10-CM

## 2019-01-17 PROCEDURE — 99203 PR OFFICE/OUTPT VISIT, NEW, LEVL III, 30-44 MIN: ICD-10-PCS | Mod: S$GLB,,, | Performed by: NEUROMUSCULOSKELETAL MEDICINE & OMM

## 2019-01-17 PROCEDURE — 3288F PR FALLS RISK ASSESSMENT DOCUMENTED: ICD-10-PCS | Mod: CPTII,S$GLB,, | Performed by: NEUROMUSCULOSKELETAL MEDICINE & OMM

## 2019-01-17 PROCEDURE — 3288F FALL RISK ASSESSMENT DOCD: CPT | Mod: CPTII,S$GLB,, | Performed by: NEUROMUSCULOSKELETAL MEDICINE & OMM

## 2019-01-17 PROCEDURE — 1100F PR PT FALLS ASSESS DOC 2+ FALLS/FALL W/INJURY/YR: ICD-10-PCS | Mod: CPTII,S$GLB,, | Performed by: NEUROMUSCULOSKELETAL MEDICINE & OMM

## 2019-01-17 PROCEDURE — 99999 PR PBB SHADOW E&M-EST. PATIENT-LVL III: CPT | Mod: PBBFAC,,, | Performed by: NEUROMUSCULOSKELETAL MEDICINE & OMM

## 2019-01-17 PROCEDURE — 99203 OFFICE O/P NEW LOW 30 MIN: CPT | Mod: S$GLB,,, | Performed by: NEUROMUSCULOSKELETAL MEDICINE & OMM

## 2019-01-17 PROCEDURE — 99999 PR PBB SHADOW E&M-EST. PATIENT-LVL III: ICD-10-PCS | Mod: PBBFAC,,, | Performed by: NEUROMUSCULOSKELETAL MEDICINE & OMM

## 2019-01-17 PROCEDURE — 1100F PTFALLS ASSESS-DOCD GE2>/YR: CPT | Mod: CPTII,S$GLB,, | Performed by: NEUROMUSCULOSKELETAL MEDICINE & OMM

## 2019-01-17 RX ORDER — LEVETIRACETAM 500 MG/1
TABLET ORAL
Qty: 60 TABLET | Refills: 0 | Status: SHIPPED | OUTPATIENT
Start: 2019-01-17 | End: 2019-05-15 | Stop reason: SDUPTHER

## 2019-01-17 NOTE — PROGRESS NOTES
History:  Patient presents for follow-up after several years.  He is being followed for seizure disorder for which she is supposed to be taking Keppra 500 mg b.i.d..  However the wife has only been giving it to him once a day.  Long discussion with the wife in reference to why he needs it twice a day due to the long-acting versus short-acting medication.  He has had no further seizures in the last several years.  Previous note 10-7-13:  Patient presents for followup for his seizure disorder.  He is taking the Keppra 500 mg b.i.d. Without problems.  Previous note:2/25/2013 Patient presents with his son for followup and test results. He has had no further spells. He continues to take Keppra 500 mg b.i.d. Without problems. EEG was abnormal on 10-1-12 showing intermittent bilateral frontal / temporal delta waves suggestive of a structural lesion(consistent with a history of subdural hematoma) however no evidence of epileptiform activity.   Previous note:8-20-12:This is an 82-year-old white male who presents for followup of a head injury with a subdural hematoma and conner hole in May 2000 111. Patient apparently fell possibly related to first onset seizure and banged his head. Patient had a subdural evacuation by Dr. Jae Gandhi in neurosurgery, Patient has a very slow meticulous gait was slightly wide-based. He is here for renewal of his medication Keppra twice a day for seizure prevention.   Neurological Exam:   Cranial nerves:pupils were equal and reactive to light ; funduscopic examination was normal with sharp disc margins. external ocular movements were full with no nystagmus. Facial muscles were symmetrical. Tongue and palate movements were normal with swallowing unimpaired. Speech was normal   Motor examination: Normal and symmetrical; right-handed   Sensory examination: Pinprick and soft touch were normal and symmetrical. 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  Discussed short-acting versus long-acting medication.  She seems to understand this concept now.  continue Keppra 500 mg b.i.d.; followup 1 year

## 2019-02-14 PROBLEM — E87.5 HYPERKALEMIA: Status: ACTIVE | Noted: 2019-02-14

## 2019-05-20 ENCOUNTER — TELEPHONE (OUTPATIENT)
Dept: NEUROLOGY | Facility: CLINIC | Age: 84
End: 2019-05-20

## 2019-05-20 NOTE — TELEPHONE ENCOUNTER
----- Message from Elmer Morris sent at 5/20/2019 11:55 AM CDT -----  Rx Refill/Request     Is this a Refill or New Rx: Refill   Rx Name and Strength: levETIRAcetam (KEPPRA) 500 MG Tab   Preferred Pharmacy with phone number: see below  Communication Preference: Bryanna Lal @ [phone]  Additional Information: Bryanna is asking to speak w/ the staff regarding this    [facility] [address] [place] [address] [place] [address] [address]  Phone: [phone] Fax: [phone]

## 2019-05-21 RX ORDER — LEVETIRACETAM 500 MG/1
TABLET ORAL
Qty: 60 TABLET | Refills: 0 | Status: SHIPPED | OUTPATIENT
Start: 2019-05-21 | End: 2019-06-12 | Stop reason: SDUPTHER

## 2019-06-13 RX ORDER — LEVETIRACETAM 500 MG/1
TABLET ORAL
Qty: 60 TABLET | Refills: 0 | Status: SHIPPED | OUTPATIENT
Start: 2019-06-13 | End: 2019-08-01 | Stop reason: SDUPTHER

## 2019-08-01 RX ORDER — LEVETIRACETAM 500 MG/1
TABLET ORAL
Qty: 60 TABLET | Refills: 0 | Status: SHIPPED | OUTPATIENT
Start: 2019-08-01 | End: 2019-09-25 | Stop reason: SDUPTHER

## 2019-09-25 RX ORDER — LEVETIRACETAM 500 MG/1
TABLET ORAL
Qty: 60 TABLET | Refills: 0 | Status: SHIPPED | OUTPATIENT
Start: 2019-09-25 | End: 2019-10-18 | Stop reason: SDUPTHER

## 2019-10-18 RX ORDER — LEVETIRACETAM 500 MG/1
TABLET ORAL
Qty: 60 TABLET | Refills: 0 | Status: SHIPPED | OUTPATIENT
Start: 2019-10-18 | End: 2020-01-21 | Stop reason: SDUPTHER

## 2019-12-05 RX ORDER — LEVETIRACETAM 500 MG/1
TABLET ORAL
Qty: 60 TABLET | Refills: 0 | Status: SHIPPED | OUTPATIENT
Start: 2019-12-05

## 2019-12-12 PROBLEM — E88.09 HYPOALBUMINEMIA: Status: ACTIVE | Noted: 2019-12-12

## 2019-12-12 PROBLEM — E87.20 ACIDOSIS, METABOLIC: Status: ACTIVE | Noted: 2019-12-12

## 2020-01-21 RX ORDER — LEVETIRACETAM 500 MG/1
TABLET ORAL
Qty: 60 TABLET | Refills: 0 | Status: SHIPPED | OUTPATIENT
Start: 2020-01-21

## 2020-03-17 RX ORDER — LEVETIRACETAM 500 MG/1
TABLET ORAL
Qty: 180 TABLET | Refills: 5 | Status: SHIPPED | OUTPATIENT
Start: 2020-03-17

## 2020-03-17 RX ORDER — LEVETIRACETAM 500 MG/1
TABLET ORAL
Qty: 60 TABLET | Refills: 0 | Status: SHIPPED | OUTPATIENT
Start: 2020-03-17 | End: 2020-03-17

## 2022-04-09 ENCOUNTER — HISTORICAL (OUTPATIENT)
Dept: ADMINISTRATIVE | Facility: HOSPITAL | Age: 87
End: 2022-04-09
Payer: MEDICARE

## 2022-04-25 VITALS
HEIGHT: 66 IN | BODY MASS INDEX: 20.9 KG/M2 | DIASTOLIC BLOOD PRESSURE: 75 MMHG | SYSTOLIC BLOOD PRESSURE: 144 MMHG | WEIGHT: 130.06 LBS | OXYGEN SATURATION: 99 %

## (undated) DEVICE — SEE MEDLINE ITEM 156955

## (undated) DEVICE — SWAB CULTURETTE SINGLE

## (undated) DEVICE — CRADLE POSITIONER FOAM DISP

## (undated) DEVICE — SEE MEDLINE ITEM 152522

## (undated) DEVICE — BANDAGE ELASTIC 3X5 VELCRO ST

## (undated) DEVICE — SYR B-D DISP CONTROL 10CC100/C

## (undated) DEVICE — PAD PREP 50/CA

## (undated) DEVICE — STRIP WOUND PK BIGUANIDE 36X.5

## (undated) DEVICE — PAD CAST 2 IN X 4YDS STERILE

## (undated) DEVICE — SEE MEDLINE ITEM 157131

## (undated) DEVICE — PACK BASIC

## (undated) DEVICE — BANDAGE ELASTIC 2X5 VELCRO ST

## (undated) DEVICE — GAUZE SPONGE 4X4 12PLY

## (undated) DEVICE — TOURNIQUET SB QC DP 18X4IN

## (undated) DEVICE — COVER OVERHEAD SURG LT BLUE

## (undated) DEVICE — DURAPREP SURG SCRUB 26ML

## (undated) DEVICE — MANIFOLD 4 PORT

## (undated) DEVICE — DRESSING XEROFORM FOIL PK 1X8

## (undated) DEVICE — GLOVE SURG BIOGEL LATEX SZ 7.5

## (undated) DEVICE — SEE MEDLINE ITEM 157173

## (undated) DEVICE — SPLINT PLASTER F.S. 3INX15IN

## (undated) DEVICE — SEE MEDLINE ITEM 157117

## (undated) DEVICE — KIT COLLECTION E SWAB REGULAR

## (undated) DEVICE — SEE MEDLINE ITEM 157116

## (undated) DEVICE — BLADE SURG #15 CARBON STEEL

## (undated) DEVICE — NDL HYPO REG 25G X 1 1/2

## (undated) DEVICE — BLADE SCALP OPHTL BEVEL STR